# Patient Record
Sex: MALE | Race: WHITE | NOT HISPANIC OR LATINO | Employment: FULL TIME | URBAN - METROPOLITAN AREA
[De-identification: names, ages, dates, MRNs, and addresses within clinical notes are randomized per-mention and may not be internally consistent; named-entity substitution may affect disease eponyms.]

---

## 2018-05-20 ENCOUNTER — OFFICE VISIT (OUTPATIENT)
Dept: RETAIL CLINIC | Facility: CLINIC | Age: 58
End: 2018-05-20

## 2018-05-20 VITALS
DIASTOLIC BLOOD PRESSURE: 70 MMHG | TEMPERATURE: 97.9 F | OXYGEN SATURATION: 98 % | HEART RATE: 79 BPM | SYSTOLIC BLOOD PRESSURE: 118 MMHG

## 2018-05-20 DIAGNOSIS — H10.33 ACUTE BACTERIAL CONJUNCTIVITIS OF BOTH EYES: Primary | ICD-10-CM

## 2018-05-20 PROCEDURE — 99202 OFFICE O/P NEW SF 15 MIN: CPT | Performed by: NURSE PRACTITIONER

## 2018-05-20 RX ORDER — POLYMYXIN B SULFATE AND TRIMETHOPRIM 1; 10000 MG/ML; [USP'U]/ML
SOLUTION OPHTHALMIC
Qty: 1 EACH | Refills: 0 | Status: SHIPPED | OUTPATIENT
Start: 2018-05-20 | End: 2019-03-07

## 2018-05-20 NOTE — PROGRESS NOTES
Subjective     Surjit Stevenson is a 57 y.o.. male.     Conjunctivitis    Episode onset: 1 week. The problem has been unchanged. Associated symptoms include eye itching, eye discharge, eye pain and eye redness. Pertinent negatives include no fever, no decreased vision, no double vision, no photophobia, no abdominal pain, no diarrhea, no nausea, no vomiting, no congestion, no ear pain, no headaches, no rhinorrhea, no sore throat and no cough.       The following portions of the patient's history were reviewed and updated as appropriate: allergies, current medications, past family history, past medical history, past social history, past surgical history and problem list.    Review of Systems   Constitutional: Negative for fever.   HENT: Negative.  Negative for congestion, ear pain, rhinorrhea and sore throat.    Eyes: Positive for pain, discharge, redness and itching. Negative for double vision, photophobia and visual disturbance.   Respiratory: Negative for cough.    Gastrointestinal: Negative for abdominal pain, diarrhea, nausea and vomiting.   Neurological: Negative for headaches.       Objective     Vitals:    05/20/18 1116   BP: 118/70   Pulse: 79   Temp: 97.9 °F (36.6 °C)   TempSrc: Oral   SpO2: 98%       Physical Exam   Constitutional: He is oriented to person, place, and time. He appears well-developed and well-nourished.   HENT:   Head: Normocephalic and atraumatic.   Eyes: Pupils are equal, round, and reactive to light. Right conjunctiva is injected. Left conjunctiva is injected.   Cardiovascular: Normal rate and regular rhythm.    Pulmonary/Chest: Effort normal.   Musculoskeletal: Normal range of motion.   Neurological: He is alert and oriented to person, place, and time.         Assessment/Plan   Surjit was seen today for conjunctivitis.    Diagnoses and all orders for this visit:    Acute bacterial conjunctivitis of both eyes  -     trimethoprim-polymyxin b (POLYTRIM) 59913-8.1 UNIT/ML-% ophthalmic solution; 1  drop to each eye 3-4 times a day for 10 days      Vision screening: right eye 20/25, left eye 20/25, both eyes 20/25    Patient Instructions   Bacterial Conjunctivitis  Bacterial conjunctivitis is an infection of the clear membrane that covers the white part of your eye and the inner surface of your eyelid (conjunctiva). When the blood vessels in your conjunctiva become inflamed, your eye becomes red or pink, and it will probably feel itchy. Bacterial conjunctivitis spreads very easily from person to person (is contagious). It also spreads easily from one eye to the other eye.  What are the causes?  This condition is caused by several common bacteria. You may get the infection if you come into close contact with another person who is infected. You may also come into contact with items that are contaminated with the bacteria, such as a face towel, contact lens solution, or eye makeup.  What increases the risk?  This condition is more likely to develop in people who:  · Are exposed to other people who have the infection.  · Wear contact lenses.  · Have a sinus infection.  · Have had a recent eye injury or surgery.  · Have a weak body defense system (immune system).  · Have a medical condition that causes dry eyes.  What are the signs or symptoms?  Symptoms of this condition include:  · Eye redness.  · Tearing or watery eyes.  · Itchy eyes.  · Burning feeling in your eyes.  · Thick, yellowish discharge from an eye. This may turn into a crust on the eyelid overnight and cause your eyelids to stick together.  · Swollen eyelids.  · Blurred vision.  How is this diagnosed?  Your health care provider can diagnose this condition based on your symptoms and medical history. Your health care provider may also take a sample of discharge from your eye to find the cause of your infection. This is rarely done.  How is this treated?  Treatment for this condition includes:  · Antibiotic eye drops or ointment to clear the infection  more quickly and prevent the spread of infection to others.  · Oral antibiotic medicines to treat infections that do not respond to drops or ointments, or last longer than 10 days.  · Cool, wet cloths (cool compresses) placed on the eyes.  · Artificial tears applied 2-6 times a day.  Follow these instructions at home:  Medicines   · Take or apply your antibiotic medicine as told by your health care provider. Do not stop taking or applying the antibiotic even if you start to feel better.  · Take or apply over-the-counter and prescription medicines only as told by your health care provider.  · Be very careful to avoid touching the edge of your eyelid with the eye drop bottle or the ointment tube when you apply medicines to the affected eye. This will keep you from spreading the infection to your other eye or to other people.  Managing discomfort   · Gently wipe away any drainage from your eye with a warm, wet washcloth or a cotton ball.  · Apply a cool, clean washcloth to your eye for 10-20 minutes, 3-4 times a day.  General instructions   · Do not wear contact lenses until the inflammation is gone and your health care provider says it is safe to wear them again. Ask your health care provider how to sterilize or replace your contact lenses before you use them again. Wear glasses until you can resume wearing contacts.  · Avoid wearing eye makeup until the inflammation is gone. Throw away any old eye cosmetics that may be contaminated.  · Change or wash your pillowcase every day.  · Do not share towels or washcloths. This may spread the infection.  · Wash your hands often with soap and water. Use paper towels to dry your hands.  · Avoid touching or rubbing your eyes.  · Do not drive or use heavy machinery if your vision is blurred.  Contact a health care provider if:  · You have a fever.  · Your symptoms do not get better after 10 days.  Get help right away if:  · You have a fever and your symptoms suddenly get  worse.  · You have severe pain when you move your eye.  · You have facial pain, redness, or swelling.  · You have sudden loss of vision.  This information is not intended to replace advice given to you by your health care provider. Make sure you discuss any questions you have with your health care provider.  Document Released: 12/18/2006 Document Revised: 04/27/2017 Document Reviewed: 09/29/2016  [employer] Patient Education © 2017 [employer]        Return if symptoms worsen or fail to improve with urgent care/ER, for follow up with optomotrist as needed, if no improvement and/or any vision issues.

## 2018-05-20 NOTE — PATIENT INSTRUCTIONS
Bacterial Conjunctivitis  Bacterial conjunctivitis is an infection of the clear membrane that covers the white part of your eye and the inner surface of your eyelid (conjunctiva). When the blood vessels in your conjunctiva become inflamed, your eye becomes red or pink, and it will probably feel itchy. Bacterial conjunctivitis spreads very easily from person to person (is contagious). It also spreads easily from one eye to the other eye.  What are the causes?  This condition is caused by several common bacteria. You may get the infection if you come into close contact with another person who is infected. You may also come into contact with items that are contaminated with the bacteria, such as a face towel, contact lens solution, or eye makeup.  What increases the risk?  This condition is more likely to develop in people who:  · Are exposed to other people who have the infection.  · Wear contact lenses.  · Have a sinus infection.  · Have had a recent eye injury or surgery.  · Have a weak body defense system (immune system).  · Have a medical condition that causes dry eyes.  What are the signs or symptoms?  Symptoms of this condition include:  · Eye redness.  · Tearing or watery eyes.  · Itchy eyes.  · Burning feeling in your eyes.  · Thick, yellowish discharge from an eye. This may turn into a crust on the eyelid overnight and cause your eyelids to stick together.  · Swollen eyelids.  · Blurred vision.  How is this diagnosed?  Your health care provider can diagnose this condition based on your symptoms and medical history. Your health care provider may also take a sample of discharge from your eye to find the cause of your infection. This is rarely done.  How is this treated?  Treatment for this condition includes:  · Antibiotic eye drops or ointment to clear the infection more quickly and prevent the spread of infection to others.  · Oral antibiotic medicines to treat infections that do not respond to drops or  ointments, or last longer than 10 days.  · Cool, wet cloths (cool compresses) placed on the eyes.  · Artificial tears applied 2-6 times a day.  Follow these instructions at home:  Medicines   · Take or apply your antibiotic medicine as told by your health care provider. Do not stop taking or applying the antibiotic even if you start to feel better.  · Take or apply over-the-counter and prescription medicines only as told by your health care provider.  · Be very careful to avoid touching the edge of your eyelid with the eye drop bottle or the ointment tube when you apply medicines to the affected eye. This will keep you from spreading the infection to your other eye or to other people.  Managing discomfort   · Gently wipe away any drainage from your eye with a warm, wet washcloth or a cotton ball.  · Apply a cool, clean washcloth to your eye for 10-20 minutes, 3-4 times a day.  General instructions   · Do not wear contact lenses until the inflammation is gone and your health care provider says it is safe to wear them again. Ask your health care provider how to sterilize or replace your contact lenses before you use them again. Wear glasses until you can resume wearing contacts.  · Avoid wearing eye makeup until the inflammation is gone. Throw away any old eye cosmetics that may be contaminated.  · Change or wash your pillowcase every day.  · Do not share towels or washcloths. This may spread the infection.  · Wash your hands often with soap and water. Use paper towels to dry your hands.  · Avoid touching or rubbing your eyes.  · Do not drive or use heavy machinery if your vision is blurred.  Contact a health care provider if:  · You have a fever.  · Your symptoms do not get better after 10 days.  Get help right away if:  · You have a fever and your symptoms suddenly get worse.  · You have severe pain when you move your eye.  · You have facial pain, redness, or swelling.  · You have sudden loss of vision.  This  information is not intended to replace advice given to you by your health care provider. Make sure you discuss any questions you have with your health care provider.  Document Released: 12/18/2006 Document Revised: 04/27/2017 Document Reviewed: 09/29/2016  [employer] Interactive Patient Education © 2017 [employer]

## 2019-03-07 ENCOUNTER — OFFICE VISIT (OUTPATIENT)
Dept: FAMILY MEDICINE CLINIC | Facility: CLINIC | Age: 59
End: 2019-03-07

## 2019-03-07 VITALS
RESPIRATION RATE: 16 BRPM | DIASTOLIC BLOOD PRESSURE: 64 MMHG | OXYGEN SATURATION: 98 % | SYSTOLIC BLOOD PRESSURE: 120 MMHG | WEIGHT: 182 LBS | TEMPERATURE: 97.7 F | HEART RATE: 78 BPM | BODY MASS INDEX: 26.96 KG/M2 | HEIGHT: 69 IN

## 2019-03-07 DIAGNOSIS — R31.9 HEMATURIA, UNSPECIFIED TYPE: ICD-10-CM

## 2019-03-07 DIAGNOSIS — Z00.00 HEALTHCARE MAINTENANCE: Primary | ICD-10-CM

## 2019-03-07 DIAGNOSIS — Z12.5 PROSTATE CANCER SCREENING: ICD-10-CM

## 2019-03-07 DIAGNOSIS — Z12.11 COLON CANCER SCREENING: ICD-10-CM

## 2019-03-07 LAB
BILIRUB BLD-MCNC: NEGATIVE MG/DL
CLARITY, POC: ABNORMAL
COLOR UR: YELLOW
GLUCOSE UR STRIP-MCNC: NEGATIVE MG/DL
KETONES UR QL: ABNORMAL
LEUKOCYTE EST, POC: NEGATIVE
NITRITE UR-MCNC: NEGATIVE MG/ML
PH UR: 6 [PH] (ref 5–8)
PROT UR STRIP-MCNC: NEGATIVE MG/DL
RBC # UR STRIP: ABNORMAL /UL
SP GR UR: 1.01 (ref 1–1.03)
UROBILINOGEN UR QL: NORMAL

## 2019-03-07 PROCEDURE — 99203 OFFICE O/P NEW LOW 30 MIN: CPT | Performed by: NURSE PRACTITIONER

## 2019-03-07 PROCEDURE — 81002 URINALYSIS NONAUTO W/O SCOPE: CPT | Performed by: NURSE PRACTITIONER

## 2019-03-07 NOTE — PROGRESS NOTES
Patient ID: Surjit Stevenson is a 58 y.o. male     Subjective     Chief Complaint   Patient presents with   • Blood in Urine       History of Present Illness    Surjit Stevenson presents to the office today for new patient evaluation.   He is a .  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  It was noted during testing he had blood in his urine.  He was only given as 6-month license and was instructed to follow-up with primary care provider concerning his hematuria.  He denies any problems or concerns with urination.  No dysuria or increased urinary frequency.  He does admit to increased alcohol intake over the last 2 years. 2 years ago after being  for over 30 years.  Last drink one week ago.  He has not seen a primary care provider in over 15 years.  Denies any prior history of heart or lung disease.  He takes no medications on a regular basis.  He is a former smoker of less than 15 years.  Quit in 1988.  His usual alcohol intake is approximately 5-6 drinks daily which could include any type of alcohol whether it is beer, bourbon, wine, etc.  Father has history of prostate and kidney cancer.        He denies any complaints of fever, chills, cough, chest pain, shortness of air, abdominal pain, nausea, or any other concerns.     The following portions of the patient's history were reviewed and updated as appropriate: allergies, current medications, past family history, past medical history, past social history, past surgical history and problem list.       Review of Systems   Constitution: Negative.   HENT: Negative.    Eyes: Negative.    Cardiovascular: Negative.    Respiratory: Negative.    Endocrine: Negative.    Hematologic/Lymphatic: Negative.    Skin: Negative.    Musculoskeletal: Negative.    Gastrointestinal: Negative.    Genitourinary: Positive for hematuria. Negative for dysuria, flank pain and frequency.   Neurological: Negative.    Psychiatric/Behavioral: Negative.        Vitals:    03/07/19  1535   BP: 120/64   Pulse: 78   Resp: 16   Temp: 97.7 °F (36.5 °C)   SpO2: 98%       Documented weights    03/07/19 1535   Weight: 82.6 kg (182 lb)       Results for orders placed or performed in visit on 03/07/19   POC Urinalysis Dipstick   Result Value Ref Range    Color Yellow Yellow, Straw, Dark Yellow, Ludy    Clarity, UA Hazy (A) Clear    Glucose, UA Negative Negative, 1000 mg/dL (3+) mg/dL    Bilirubin Negative Negative    Ketones, UA Trace (A) Negative    Specific Gravity  1.015 1.005 - 1.030    Blood, UA Large (A) Negative    pH, Urine 6.0 5.0 - 8.0    Protein, POC Negative Negative mg/dL    Urobilinogen, UA Normal Normal    Leukocytes Negative Negative    Nitrite, UA Negative Negative       Objective     Physical Exam   Constitutional: He is oriented to person, place, and time. Vital signs are normal. He appears well-developed.   HENT:   Head: Normocephalic and atraumatic.   Right Ear: Tympanic membrane normal.   Left Ear: Tympanic membrane normal.   Mouth/Throat: Oropharynx is clear and moist.   Eyes: EOM are normal. Pupils are equal, round, and reactive to light.   Neck: Normal range of motion. Neck supple.   Cardiovascular: Normal rate, regular rhythm, normal heart sounds and intact distal pulses.   No murmur heard.  Pulmonary/Chest: Effort normal and breath sounds normal. He has no wheezes. He has no rhonchi. He has no rales.   Abdominal: Soft. Bowel sounds are normal. There is no hepatosplenomegaly. There is no tenderness.   Musculoskeletal: Normal range of motion. He exhibits no edema or tenderness.   Neurological: He is alert and oriented to person, place, and time. He has normal strength.   Skin: Skin is warm and dry. No rash noted. No cyanosis or erythema.   Psychiatric: He has a normal mood and affect. His behavior is normal.          Assessment/Plan     Assessment/Plan   Surjit was seen today for blood in urine.    Diagnoses and all orders for this visit:    Healthcare maintenance  -     CBC &  Differential; Future  -     Comprehensive Metabolic Panel; Future  -     Lipid Panel; Future  -     TSH; Future    Hematuria, unspecified type  -     POC Urinalysis Dipstick  -     Urine Culture - Urine, Urine, Clean Catch  -     CBC & Differential; Future  -     Comprehensive Metabolic Panel; Future  -     CT Abdomen Pelvis Stone Protocol; Future    Colon cancer screening  -     Ambulatory Referral For Screening Colonoscopy    Prostate cancer screening  -     PSA SCREENING; Future          Summary:  Surjit Stevenson presented to the office today for new patient evaluation.  Urinalysis confirmed 4+ blood.  No signs of leukocytes or infection.  Will send urine for culture.  Instructed will need to obtain fasting labs.  Also will need to obtain CT scan of abdomen and pelvis to rule out kidney stones for cause of hematuria.  I am concerned due to his father's history of prostate and kidney cancer.  Along with his history of painless hematuria.  With his labs we will also check a PSA level.  He is 58 years old.  Instructed on the importance of screening colonoscopy which we will also place referral for.  Once lab testing is completed, I will call him with results.  Once a CT scan of abdomen and pelvis is completed, we will do be able to determine further recommendations concerning his hematuria.  In the meantime instructed to continue to refrain from drinking alcohol.  Continue to drink plenty of water.    In the meantime, instructed to contact us for any problems or concerns.    Yennifer Rosa, APRN  Family Medicine  [facility]  03/07/19  4:01 PM

## 2019-03-09 LAB
BACTERIA UR CULT: NO GROWTH
BACTERIA UR CULT: NORMAL

## 2019-03-11 ENCOUNTER — RESULTS ENCOUNTER (OUTPATIENT)
Dept: FAMILY MEDICINE CLINIC | Facility: CLINIC | Age: 59
End: 2019-03-11

## 2019-03-11 DIAGNOSIS — Z00.00 HEALTHCARE MAINTENANCE: ICD-10-CM

## 2019-03-11 DIAGNOSIS — R31.9 HEMATURIA, UNSPECIFIED TYPE: ICD-10-CM

## 2019-03-12 ENCOUNTER — RESULTS ENCOUNTER (OUTPATIENT)
Dept: FAMILY MEDICINE CLINIC | Facility: CLINIC | Age: 59
End: 2019-03-12

## 2019-03-12 DIAGNOSIS — Z12.5 PROSTATE CANCER SCREENING: ICD-10-CM

## 2019-03-12 LAB
ALBUMIN SERPL-MCNC: 3.9 G/DL (ref 3.5–5.2)
ALBUMIN/GLOB SERPL: 1.3 G/DL
ALP SERPL-CCNC: 94 U/L (ref 39–117)
ALT SERPL-CCNC: 17 U/L (ref 1–41)
AST SERPL-CCNC: 18 U/L (ref 1–40)
BASOPHILS # BLD AUTO: 0.01 10*3/MM3 (ref 0–0.2)
BASOPHILS NFR BLD AUTO: 0.2 % (ref 0–1.5)
BILIRUB SERPL-MCNC: 0.6 MG/DL (ref 0.1–1.2)
BUN SERPL-MCNC: 16 MG/DL (ref 6–20)
BUN/CREAT SERPL: 17.2 (ref 7–25)
CALCIUM SERPL-MCNC: 9.5 MG/DL (ref 8.6–10.5)
CHLORIDE SERPL-SCNC: 104 MMOL/L (ref 98–107)
CHOLEST SERPL-MCNC: 152 MG/DL (ref 0–200)
CO2 SERPL-SCNC: 25.1 MMOL/L (ref 22–29)
CREAT SERPL-MCNC: 0.93 MG/DL (ref 0.76–1.27)
EOSINOPHIL # BLD AUTO: 0.07 10*3/MM3 (ref 0–0.4)
EOSINOPHIL NFR BLD AUTO: 1.6 % (ref 0.3–6.2)
ERYTHROCYTE [DISTWIDTH] IN BLOOD BY AUTOMATED COUNT: 13.3 % (ref 12.3–15.4)
GLOBULIN SER CALC-MCNC: 3 GM/DL
GLUCOSE SERPL-MCNC: 88 MG/DL (ref 65–99)
HCT VFR BLD AUTO: 44.5 % (ref 37.5–51)
HDLC SERPL-MCNC: 46 MG/DL (ref 40–60)
HGB BLD-MCNC: 13.7 G/DL (ref 13–17.7)
IMM GRANULOCYTES # BLD AUTO: 0.01 10*3/MM3 (ref 0–0.05)
IMM GRANULOCYTES NFR BLD AUTO: 0.2 % (ref 0–0.5)
LDLC SERPL CALC-MCNC: 96 MG/DL (ref 0–100)
LYMPHOCYTES # BLD AUTO: 0.72 10*3/MM3 (ref 0.7–3.1)
LYMPHOCYTES NFR BLD AUTO: 16.1 % (ref 19.6–45.3)
MCH RBC QN AUTO: 29.2 PG (ref 26.6–33)
MCHC RBC AUTO-ENTMCNC: 30.8 G/DL (ref 31.5–35.7)
MCV RBC AUTO: 94.9 FL (ref 79–97)
MONOCYTES # BLD AUTO: 0.35 10*3/MM3 (ref 0.1–0.9)
MONOCYTES NFR BLD AUTO: 7.8 % (ref 5–12)
NEUTROPHILS # BLD AUTO: 3.32 10*3/MM3 (ref 1.4–7)
NEUTROPHILS NFR BLD AUTO: 74.1 % (ref 42.7–76)
NRBC BLD AUTO-RTO: 0 /100 WBC (ref 0–0)
PLATELET # BLD AUTO: 185 10*3/MM3 (ref 140–450)
POTASSIUM SERPL-SCNC: 4.3 MMOL/L (ref 3.5–5.2)
PROT SERPL-MCNC: 6.9 G/DL (ref 6–8.5)
PSA SERPL-MCNC: 2.39 NG/ML (ref 0–4)
RBC # BLD AUTO: 4.69 10*6/MM3 (ref 4.14–5.8)
SODIUM SERPL-SCNC: 140 MMOL/L (ref 136–145)
TRIGL SERPL-MCNC: 50 MG/DL (ref 0–150)
TSH SERPL DL<=0.005 MIU/L-ACNC: 1.23 MIU/ML (ref 0.27–4.2)
VLDLC SERPL CALC-MCNC: 10 MG/DL (ref 5–40)
WBC # BLD AUTO: 4.48 10*3/MM3 (ref 3.4–10.8)
WRITTEN AUTHORIZATION: NORMAL

## 2019-03-12 NOTE — PROGRESS NOTES
Notify Surjit Stevenson all his recent labs are stable including his prostate screening test.  Urine test did not show any signs of infection on culture that was sent out.  We will proceed with CT scan of abdomen and pelvis for further evaluation concerning his hematuria.

## 2019-03-15 ENCOUNTER — HOSPITAL ENCOUNTER (OUTPATIENT)
Dept: CT IMAGING | Facility: HOSPITAL | Age: 59
Discharge: HOME | End: 2019-03-15
Admitting: NURSE PRACTITIONER

## 2019-03-15 DIAGNOSIS — R31.9 HEMATURIA, UNSPECIFIED TYPE: ICD-10-CM

## 2019-03-15 PROCEDURE — 74176 CT ABD & PELVIS W/O CONTRAST: CPT

## 2019-03-18 DIAGNOSIS — R31.9 HEMATURIA, UNSPECIFIED TYPE: Primary | ICD-10-CM

## 2023-04-19 ENCOUNTER — OFFICE VISIT (OUTPATIENT)
Dept: INTERNAL MEDICINE | Facility: CLINIC | Age: 63
End: 2023-04-19
Payer: COMMERCIAL

## 2023-04-19 ENCOUNTER — TELEPHONE (OUTPATIENT)
Dept: INTERNAL MEDICINE | Facility: CLINIC | Age: 63
End: 2023-04-19

## 2023-04-19 VITALS
WEIGHT: 186 LBS | BODY MASS INDEX: 27.55 KG/M2 | DIASTOLIC BLOOD PRESSURE: 72 MMHG | TEMPERATURE: 96.9 F | SYSTOLIC BLOOD PRESSURE: 118 MMHG | HEIGHT: 69 IN | HEART RATE: 103 BPM | OXYGEN SATURATION: 95 %

## 2023-04-19 DIAGNOSIS — Z78.9 ALCOHOL USE: ICD-10-CM

## 2023-04-19 DIAGNOSIS — M25.462 PAIN AND SWELLING OF LEFT KNEE: Primary | ICD-10-CM

## 2023-04-19 DIAGNOSIS — M25.562 PAIN AND SWELLING OF LEFT KNEE: Primary | ICD-10-CM

## 2023-04-19 DIAGNOSIS — Z12.11 SCREENING FOR COLON CANCER: ICD-10-CM

## 2023-04-19 DIAGNOSIS — E66.3 OVERWEIGHT (BMI 25.0-29.9): ICD-10-CM

## 2023-04-19 NOTE — TELEPHONE ENCOUNTER
Caller: Surjit Stevenson    Relationship to patient: Self    Best call back number: [phone]    Patient is needing: PATIENT STATES HE SEEN DR SORENSEN TODAY AND SHE WAS SENDING IN A PRESCRIPTION FOR AN  anti-inflammatory  AND [facility] INFORMED HIM THAT IT HAS NOT BEEN RECEIVED

## 2023-04-19 NOTE — PROGRESS NOTES
"Chief Complaint  Establish Care and Joint Swelling    Subjective        Surjit Stevenson presents to [facility] PRIMARY [facility]  History of Present Illness     Mr. Stevenson is a crow 61 yo M who presents to establish care and has pain in bones and joint for the last 1 year.      Had knee operations b/l about 40 years ago.  Hernia surgery about 25 years ago.     Has been  for the last 6 years and has been drinking a bit more.  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 Quit smoking 34 years ago.         Objective   Vital Signs:  /72   Pulse 103   Temp 96.9 °F (36.1 °C)   Ht 175.3 cm (69.02\")   Wt 84.4 kg (186 lb)   SpO2 95%   BMI 27.45 kg/m²   Estimated body mass index is 27.45 kg/m² as calculated from the following:    Height as of this encounter: 175.3 cm (69.02\").    Weight as of this encounter: 84.4 kg (186 lb).       BMI is >= 25 and <30. (Overweight) The following options were offered after discussion;: exercise counseling/recommendations and nutrition counseling/recommendations      Physical Exam  Vitals reviewed.   Constitutional:       General: He is not in acute distress.     Appearance: Normal appearance.   HENT:      Head: Normocephalic and atraumatic.      Nose: Nose normal.      Mouth/Throat:      Mouth: Mucous membranes are moist.   Eyes:      Conjunctiva/sclera: Conjunctivae normal.   Cardiovascular:      Rate and Rhythm: Normal rate and regular rhythm.      Pulses: Normal pulses.      Heart sounds: Normal heart sounds.   Pulmonary:      Effort: Pulmonary effort is normal.      Breath sounds: Normal breath sounds.   Abdominal:      Palpations: Abdomen is soft.      Tenderness: There is no abdominal tenderness.   Musculoskeletal:      Right lower leg: No edema.      Left lower leg: No edema.      Comments: Very swollen left knee with very mild warmth, but no real erythema, wound.  Enlarged elbow joints as well   Skin:     General: Skin is warm and dry. "   Neurological:      General: No focal deficit present.      Mental Status: He is alert.   Psychiatric:         Mood and Affect: Mood normal.        Result Review :  The following data was reviewed by: Felicia Tomlinson MD on 04/19/2023:  Common labs        4/19/2023    09:36   Common Labs   Glucose 102     BUN 14     Creatinine 0.96     Sodium 137     Potassium 4.5     Chloride 101     Calcium 9.4     Total Protein 7.4     Albumin 3.8     Total Bilirubin 0.4     Alkaline Phosphatase 125     AST (SGOT) 24     ALT (SGPT) 32     Total Cholesterol 194     Triglycerides 73     HDL Cholesterol 57     LDL Cholesterol  124     Hemoglobin A1C 5.90     Uric Acid 7.2       Data reviewed: notes and labs from [place] chart             Assessment and Plan   Diagnoses and all orders for this visit:    1. Pain and swelling of left knee (Primary)  -     Uric Acid  -     XR knee 3 vw left  -     Ambulatory Referral to Orthopedic Surgery    2. Overweight (BMI 25.0-29.9)  -     Lipid Panel  -     Hemoglobin A1c  -     Comprehensive Metabolic Panel    3. Alcohol use  -     Lipid Panel  -     Hemoglobin A1c  -     Comprehensive Metabolic Panel    4. Screening for colon cancer  -     Cologuard - Stool, Per Rectum; Future      Communicated with ortho via scheduling who got him in tomorrow AM for drainage of knee effusion and assessment of associated fluid.  Start on Mobic for pain relief.  Basic labs for assessment of multiple chronic health issues.    Briefly discuss appropriate alcohol use and need to drink in moderation.     Discussed risk/benefit of types of colon cancer screening.  Brynn agreed upon         Follow Up   No follow-ups on file.  Patient was given instructions and counseling regarding his condition or for health maintenance advice. Please see specific information pulled into the AVS if appropriate.

## 2023-04-20 ENCOUNTER — OFFICE VISIT (OUTPATIENT)
Dept: ORTHOPEDIC SURGERY | Facility: CLINIC | Age: 63
End: 2023-04-20
Payer: COMMERCIAL

## 2023-04-20 VITALS
SYSTOLIC BLOOD PRESSURE: 133 MMHG | HEART RATE: 84 BPM | WEIGHT: 186 LBS | DIASTOLIC BLOOD PRESSURE: 83 MMHG | BODY MASS INDEX: 27.55 KG/M2 | HEIGHT: 69 IN

## 2023-04-20 DIAGNOSIS — M25.462 EFFUSION OF LEFT KNEE: ICD-10-CM

## 2023-04-20 DIAGNOSIS — M17.32 POST-TRAUMATIC OSTEOARTHRITIS OF LEFT KNEE: Primary | ICD-10-CM

## 2023-04-20 LAB
ALBUMIN SERPL-MCNC: 3.8 G/DL (ref 3.5–5.2)
ALBUMIN/GLOB SERPL: 1.1 G/DL
ALP SERPL-CCNC: 125 U/L (ref 39–117)
ALT SERPL-CCNC: 32 U/L (ref 1–41)
APPEARANCE FLD: ABNORMAL
AST SERPL-CCNC: 24 U/L (ref 1–40)
BILIRUB SERPL-MCNC: 0.4 MG/DL (ref 0–1.2)
BUN SERPL-MCNC: 14 MG/DL (ref 8–23)
BUN/CREAT SERPL: 14.6 (ref 7–25)
CALCIUM SERPL-MCNC: 9.4 MG/DL (ref 8.6–10.5)
CHLORIDE SERPL-SCNC: 101 MMOL/L (ref 98–107)
CHOLEST SERPL-MCNC: 194 MG/DL (ref 0–200)
CO2 SERPL-SCNC: 26.3 MMOL/L (ref 22–29)
COLOR FLD: ABNORMAL
CREAT SERPL-MCNC: 0.96 MG/DL (ref 0.76–1.27)
CRYSTALS FLD MICRO: NORMAL
EGFRCR SERPLBLD CKD-EPI 2021: 90.5 ML/MIN/1.73
GLOBULIN SER CALC-MCNC: 3.6 GM/DL
GLUCOSE SERPL-MCNC: 102 MG/DL (ref 65–99)
HBA1C MFR BLD: 5.9 % (ref 4.8–5.6)
HDLC SERPL-MCNC: 57 MG/DL (ref 40–60)
LDLC SERPL CALC-MCNC: 124 MG/DL (ref 0–100)
LYMPHOCYTES NFR FLD MANUAL: 25 %
NEUTROPHILS NFR FLD MANUAL: 75 %
POTASSIUM SERPL-SCNC: 4.5 MMOL/L (ref 3.5–5.2)
PROT SERPL-MCNC: 7.4 G/DL (ref 6–8.5)
RBC # FLD AUTO: 7000 /MM3
SODIUM SERPL-SCNC: 137 MMOL/L (ref 136–145)
TRIGL SERPL-MCNC: 73 MG/DL (ref 0–150)
URATE SERPL-MCNC: 7.2 MG/DL (ref 3.4–7)
VLDLC SERPL CALC-MCNC: 13 MG/DL (ref 5–40)
WBC # FLD AUTO: ABNORMAL /MM3

## 2023-04-20 PROCEDURE — 87015 SPECIMEN INFECT AGNT CONCNTJ: CPT | Performed by: INTERNAL MEDICINE

## 2023-04-20 PROCEDURE — 87205 SMEAR GRAM STAIN: CPT | Performed by: INTERNAL MEDICINE

## 2023-04-20 PROCEDURE — 89060 EXAM SYNOVIAL FLUID CRYSTALS: CPT | Performed by: INTERNAL MEDICINE

## 2023-04-20 PROCEDURE — 89051 BODY FLUID CELL COUNT: CPT | Performed by: INTERNAL MEDICINE

## 2023-04-20 PROCEDURE — 87075 CULTR BACTERIA EXCEPT BLOOD: CPT | Performed by: INTERNAL MEDICINE

## 2023-04-20 PROCEDURE — 87070 CULTURE OTHR SPECIMN AEROBIC: CPT | Performed by: INTERNAL MEDICINE

## 2023-04-20 RX ORDER — MELOXICAM 15 MG/1
15 TABLET ORAL DAILY
Qty: 30 TABLET | Refills: 0 | Status: SHIPPED | OUTPATIENT
Start: 2023-04-20

## 2023-04-20 RX ORDER — ACETAMINOPHEN 500 MG
500 TABLET ORAL EVERY 6 HOURS PRN
COMMUNITY

## 2023-04-20 NOTE — PROGRESS NOTES
"Subjective:     Patient ID: Surjit Stevenson is a 62 y.o. male.    Chief Complaint:    History of Present Illness  Surjit Stevenson presents to clinic today for evaluation of severe left knee pain and swelling.  The patient has had multiple knee surgeries in the past on his left knee and he has quite a few scars.  He states usually his knee has swollen and in the past but when it does it usually goes down over the next few days.  He states that it is pink and significantly swollen for the last 6 months.  He saw Dr. Tomlinson yesterday and had elevated uric acid so he was sent to me for aspiration and further evaluation and management.  The patient is not sure if he has a history of gout but he does have a history of multiple painful joints and swelling.  He had an elevated uric acid yesterday.     Social History     Occupational History   • Not on file   Tobacco Use   • Smoking status: Former     Types: Cigarettes     Quit date:      Years since quittin.3     Passive exposure: Past   • Smokeless tobacco: Never   • Tobacco comments:     quit 30 yrs ago; was 1 ppd for 11 yrs   Vaping Use   • Vaping Use: Never used   Substance and Sexual Activity   • Alcohol use: Yes     Comment: 10   • Drug use: No   • Sexual activity: Defer      History reviewed. No pertinent past medical history.  Past Surgical History:   Procedure Laterality Date   • HERNIA REPAIR     • KNEE SURGERY Bilateral     1980s       Family History   Problem Relation Age of Onset   • Hypertension Mother    • Prostate cancer Father    • Kidney cancer Father                  Objective:  Vitals:    23 0755   BP: 133/83   Pulse: 84   Weight: 84.4 kg (186 lb)   Height: 175.3 cm (69.02\")         23  0755   Weight: 84.4 kg (186 lb)     Body mass index is 27.45 kg/m².        Left Knee Exam     Tenderness   The patient is experiencing tenderness in the medial retinaculum, medial joint line, medial hamstring, lateral retinaculum, patella and lateral joint " line.    Range of Motion   Extension: 0   Flexion: 120     Tests   Sonia:  Medial - negative Lateral - negative  Varus: positive Valgus: negative  Drawer:  Anterior - positive     Posterior - negative    Other   Erythema: absent  Scars: present  Sensation: normal  Pulse: present  Swelling: moderate  Effusion: effusion present    Comments:  Large effusion without erythema or warmth.  Palpation seems indicate there is a rent in the medial retinaculum.               Imagin standing views of the left knee were ordered and reviewed myself in the office today  Indication: Left knee pain  Findings: X-rays demonstrate severe posttraumatic osteoarthritis of the left knee.  There is signs of prior MCL reconstruction.  There is complete loss of joint space in the medial side with large marginal osteophytes.  Large suprapatellar effusion noted.  No acute fracture dislocation or subluxation  Comparative studies: None    Assessment:        1. Post-traumatic osteoarthritis of left knee    2. Effusion of left knee           Plan:          1. Discussed treatment options at length with patient at today's visit.  I discussed with the patient that I believe that this is run-of-the-mill posttraumatic osteoarthritis but there is a possibility that he may have gout.  I am not currently concerned about infection but since we are sending aspirate to the lab we will go ahead and run it for culture and Gram stain.  I recommended aspiration and corticosteroid injection into his left knee.  The left knee was aspirated and returned roughly 100 cc of yellow but cloudy joint fluid.  We will send the aspirate for Gram stain cell count crystals and culture.  I will call the patient with results.  2. Follow up: As needed      Surjit Stevenson was in agreement with plan and had all questions answered.     Medications:  No orders of the defined types were placed in this encounter.      Followup:  No follow-ups on file.    Diagnoses and all orders for  this visit:    1. Post-traumatic osteoarthritis of left knee (Primary)  -     XR Knee 3+ View With Devers Left  -     Crystal Exam, Fluid - Synovial Fluid, Knee, Left; Future  -     Body Fluid Cell Count With Differential - Body Fluid, Knee, Left; Future  -     Gram Stain - No Culture - , Knee, Left; Future  -     Anaerobic Culture - Swab, Knee, Left; Future  -     Culture, Routine - Swab, Knee, Left; Future    2. Effusion of left knee          Dictated utilizing Dragon dictation

## 2023-04-23 LAB — BACTERIA SPEC ANAEROBE CULT: NORMAL

## 2023-04-25 LAB
BACTERIA FLD CULT: NORMAL
BACTERIA SPEC ANAEROBE CULT: NORMAL
GRAM STN SPEC: NORMAL
GRAM STN SPEC: NORMAL

## 2023-05-16 ENCOUNTER — OFFICE VISIT (OUTPATIENT)
Dept: INTERNAL MEDICINE | Facility: CLINIC | Age: 63
End: 2023-05-16
Payer: COMMERCIAL

## 2023-05-16 VITALS
SYSTOLIC BLOOD PRESSURE: 118 MMHG | HEART RATE: 83 BPM | HEIGHT: 69 IN | OXYGEN SATURATION: 96 % | WEIGHT: 193.2 LBS | BODY MASS INDEX: 28.61 KG/M2 | TEMPERATURE: 98.4 F | DIASTOLIC BLOOD PRESSURE: 60 MMHG

## 2023-05-16 DIAGNOSIS — M17.0 PRIMARY OSTEOARTHRITIS OF BOTH KNEES: Primary | ICD-10-CM

## 2023-05-16 DIAGNOSIS — F32.1 CURRENT MODERATE EPISODE OF MAJOR DEPRESSIVE DISORDER WITHOUT PRIOR EPISODE: ICD-10-CM

## 2023-05-16 PROCEDURE — 99213 OFFICE O/P EST LOW 20 MIN: CPT | Performed by: INTERNAL MEDICINE

## 2023-05-16 RX ORDER — CITALOPRAM 10 MG/1
10 TABLET ORAL DAILY
Qty: 90 TABLET | Refills: 0 | Status: SHIPPED | OUTPATIENT
Start: 2023-05-16

## 2023-05-16 RX ORDER — NAPROXEN 500 MG/1
500 TABLET ORAL 2 TIMES DAILY WITH MEALS
Qty: 60 TABLET | Refills: 0 | Status: SHIPPED | OUTPATIENT
Start: 2023-05-16

## 2023-05-16 NOTE — PROGRESS NOTES
"      Surjit Stevenson is a 62 y.o. male who presents with a chief complaint of   Chief Complaint   Patient presents with   • Joint Swelling     F/U on labs and knee pain       HPI      Left knee better following drainage with Dr. Dowling, but feels it may have started swelling up again.     Talked about drinking too much.  Discussed cutting back to help with cholesterol, A1c.  Not a lot of soda.  We discussed drinking and its relationship to his family and wife leaving.       The following portions of the patient's history were reviewed and updated as appropriate: allergies, current medications, past family history, past medical history, past social history, past surgical history and problem list.      Current Outpatient Medications:   •  acetaminophen (TYLENOL) 500 MG tablet, Take 1 tablet by mouth Every 6 (Six) Hours As Needed for Mild Pain., Disp: , Rfl:   •  citalopram (CeleXA) 10 MG tablet, Take 1 tablet by mouth Daily., Disp: 90 tablet, Rfl: 0  •  naproxen (Naprosyn) 500 MG tablet, Take 1 tablet by mouth 2 (Two) Times a Day With Meals., Disp: 60 tablet, Rfl: 0            Physical Exam  /60 (BP Location: Left arm, Patient Position: Sitting, Cuff Size: Large Adult)   Pulse 83   Temp 98.4 °F (36.9 °C) (Infrared)   Ht 175.3 cm (69.02\")   Wt 87.6 kg (193 lb 3.2 oz)   SpO2 96%   BMI 28.51 kg/m²     Physical Exam  Vitals reviewed.   Constitutional:       General: He is not in acute distress.     Appearance: Normal appearance.   HENT:      Head: Normocephalic and atraumatic.      Nose: Nose normal.      Mouth/Throat:      Mouth: Mucous membranes are moist.   Eyes:      Conjunctiva/sclera: Conjunctivae normal.   Cardiovascular:      Rate and Rhythm: Normal rate and regular rhythm.      Pulses: Normal pulses.      Heart sounds: Normal heart sounds.   Pulmonary:      Effort: Pulmonary effort is normal.      Breath sounds: Normal breath sounds.   Abdominal:      Palpations: Abdomen is soft.      Tenderness: There " is no abdominal tenderness.   Musculoskeletal:      Right lower leg: No edema.      Left lower leg: No edema.   Skin:     General: Skin is warm and dry.   Neurological:      General: No focal deficit present.      Mental Status: He is alert.   Psychiatric:         Mood and Affect: Mood normal.           Results for orders placed or performed in visit on 04/20/23   Anaerobic Culture - Swab, Knee, Left    Specimen: Knee, Left; Swab   Result Value Ref Range    Anaerobic Culture No anaerobes isolated at 5 days    Body Fluid Culture - Body Fluid, Knee, Left    Specimen: Knee, Left; Body Fluid   Result Value Ref Range    Body Fluid Culture No growth at 5 days     Gram Stain No WBCs seen     Gram Stain No organisms seen    Crystal Exam, Fluid - Synovial Fluid, Knee, Left    Specimen: Knee, Left; Synovial Fluid   Result Value Ref Range    Crystals, Fluid None Seen    Body fluid cell count - Body Fluid, Knee, Left    Specimen: Knee, Left; Body Fluid   Result Value Ref Range    Color, Fluid Dark Yellow     Appearance, Fluid Lt Turbid (A) Clear    WBC, Fluid 26,080 /mm3    RBC, Fluid 7,000 /mm3   Body fluid differential - Body Fluid, Knee, Left    Specimen: Knee, Left; Body Fluid   Result Value Ref Range    Neutrophils, Fluid 75 %    Lymphocytes, Fluid 25 %           Diagnoses and all orders for this visit:    1. Primary osteoarthritis of both knees (Primary)  -     naproxen (Naprosyn) 500 MG tablet; Take 1 tablet by mouth 2 (Two) Times a Day With Meals.  Dispense: 60 tablet; Refill: 0    2. Current moderate episode of major depressive disorder without prior episode  -     citalopram (CeleXA) 10 MG tablet; Take 1 tablet by mouth Daily.  Dispense: 90 tablet; Refill: 0

## 2023-05-18 ENCOUNTER — TELEPHONE (OUTPATIENT)
Dept: INTERNAL MEDICINE | Facility: CLINIC | Age: 63
End: 2023-05-18
Payer: COMMERCIAL

## 2023-05-18 NOTE — TELEPHONE ENCOUNTER
HUB TO SHARE  ----- Message from Felicia Tomlinson MD sent at 5/18/2023 10:08 AM EDT -----  Can you let Mr. Stevenson know his Cologuard was negative.  Thanks!     
94.7

## 2023-07-17 PROBLEM — F33.1 MODERATE EPISODE OF RECURRENT MAJOR DEPRESSIVE DISORDER: Status: ACTIVE | Noted: 2023-07-17

## 2023-07-17 PROBLEM — M17.12 PRIMARY OSTEOARTHRITIS OF LEFT KNEE: Status: ACTIVE | Noted: 2023-07-17

## 2023-08-07 DIAGNOSIS — M17.0 PRIMARY OSTEOARTHRITIS OF BOTH KNEES: ICD-10-CM

## 2023-08-07 NOTE — TELEPHONE ENCOUNTER
HGB greater than 10 or HCT greater than 30 in past 9 months   Rx Refill Note  Requested Prescriptions     Pending Prescriptions Disp Refills    naproxen (Naprosyn) 500 MG tablet 60 tablet 0     Sig: Take 1 tablet by mouth 2 (Two) Times a Day With Meals.      Last office visit with prescribing clinician: 7/17/2023   Last telemedicine visit with prescribing clinician: Visit date not found   Next office visit with prescribing clinician: 11/17/2023                         Would you like a call back once the refill request has been completed: [] Yes [] No    If the office needs to give you a call back, can they leave a voicemail: [] Yes [] No    Maribel Yusuf, PCT  08/07/23, 11:14 EDT

## 2023-08-07 NOTE — TELEPHONE ENCOUNTER
Caller: Surjit Stevenson    Relationship: Self    Best call back number: [phone]     Requested Prescriptions:   Requested Prescriptions     Pending Prescriptions Disp Refills    naproxen (Naprosyn) 500 MG tablet 60 tablet 0     Sig: Take 1 tablet by mouth 2 (Two) Times a Day With Meals.        Pharmacy where request should be sent: [facility] [address] [place], [place] - [address] [facility] [phone] [facility] [phone] FX     Last office visit with prescribing clinician: 7/17/2023   Last telemedicine visit with prescribing clinician: Visit date not found   Next office visit with prescribing clinician: 11/17/2023     Additional details provided by patient: PATIENT IS OUT OF MEDICATION    Does the patient have less than a 3 day supply:  [x] Yes  [] No    Would you like a call back once the refill request has been completed: [] Yes [x] No    If the office needs to give you a call back, can they leave a voicemail: [] Yes [x] No    Rae Dalton Rep   08/07/23 09:47 EDT

## 2023-08-08 RX ORDER — NAPROXEN 500 MG/1
500 TABLET ORAL 2 TIMES DAILY WITH MEALS
Qty: 60 TABLET | Refills: 2 | Status: SHIPPED | OUTPATIENT
Start: 2023-08-08

## 2023-08-31 ENCOUNTER — OFFICE VISIT (OUTPATIENT)
Dept: ORTHOPEDIC SURGERY | Facility: CLINIC | Age: 63
End: 2023-08-31
Payer: COMMERCIAL

## 2023-08-31 VITALS — BODY MASS INDEX: 28.58 KG/M2 | HEIGHT: 69 IN | WEIGHT: 193 LBS

## 2023-08-31 DIAGNOSIS — M17.32 POST-TRAUMATIC OSTEOARTHRITIS OF LEFT KNEE: Primary | ICD-10-CM

## 2023-08-31 RX ORDER — TRIAMCINOLONE ACETONIDE 40 MG/ML
80 INJECTION, SUSPENSION INTRA-ARTICULAR; INTRAMUSCULAR
Status: COMPLETED | OUTPATIENT
Start: 2023-08-31 | End: 2023-08-31

## 2023-08-31 RX ORDER — LIDOCAINE HYDROCHLORIDE 10 MG/ML
4 INJECTION, SOLUTION EPIDURAL; INFILTRATION; INTRACAUDAL; PERINEURAL
Status: COMPLETED | OUTPATIENT
Start: 2023-08-31 | End: 2023-08-31

## 2023-08-31 RX ADMIN — LIDOCAINE HYDROCHLORIDE 4 ML: 10 INJECTION, SOLUTION EPIDURAL; INFILTRATION; INTRACAUDAL; PERINEURAL at 15:22

## 2023-08-31 RX ADMIN — TRIAMCINOLONE ACETONIDE 80 MG: 40 INJECTION, SUSPENSION INTRA-ARTICULAR; INTRAMUSCULAR at 15:22

## 2023-08-31 NOTE — PROGRESS NOTES
Plan:      Large Joint Arthrocentesis: L knee  Date/Time: 8/31/2023 3:22 PM  Consent given by: patient  Site marked: site marked  Timeout: Immediately prior to procedure a time out was called to verify the correct patient, procedure, equipment, support staff and site/side marked as required   Supporting Documentation  Indications: pain   Procedure Details  Location: knee - L knee  Preparation: Patient was prepped and draped in the usual sterile fashion  Needle size: 18 G  Approach: superior  Medications administered: 4 mL lidocaine PF 1% 1 %; 80 mg triamcinolone acetonide 40 MG/ML  Aspirate amount: 100 mL  Aspirate: blood-tinged  Patient tolerance: patient tolerated the procedure well with no immediate complications      No diagnosis found.    Patient presents to clinic today for left knee aspiration and injection(s). I explained details of injections as well as risks, benefits and alternatives with the patient today, had all questions answered, wished to proceed with injection.  I will see patient back as needed for follow up on injections. Patient was instructed to watch for signs or symptoms of infection including redness, swelling, warmth to the touch, or significant increased pain and to contact our office immediately if any of these issues were noted.      Follow up: As needed but not until November 30 first 2023 if she would like another injection.

## 2023-09-22 DIAGNOSIS — M17.0 PRIMARY OSTEOARTHRITIS OF BOTH KNEES: ICD-10-CM

## 2023-09-22 RX ORDER — NAPROXEN 500 MG/1
500 TABLET ORAL 2 TIMES DAILY WITH MEALS
Qty: 60 TABLET | Refills: 2 | Status: SHIPPED | OUTPATIENT
Start: 2023-09-22

## 2023-09-22 NOTE — TELEPHONE ENCOUNTER
Caller: Surjit Stevenson    Relationship: Self    Best call back number: [phone]    Requested Prescriptions:   Requested Prescriptions     Pending Prescriptions Disp Refills    naproxen (Naprosyn) 500 MG tablet 60 tablet 2     Sig: Take 1 tablet by mouth 2 (Two) Times a Day With Meals.        Pharmacy where request should be sent: [facility] [address] [facility] [place] - [address] [facility] [phone] [facility] [phone] FX     Last office visit with prescribing clinician: 7/17/2023   Last telemedicine visit with prescribing clinician: Visit date not found   Next office visit with prescribing clinician: 11/17/2023     Additional details provided by patient:     Does the patient have less than a 3 day supply:  [] Yes  [] No    Would you like a call back once the refill request has been completed: [] Yes [] No    If the office needs to give you a call back, can they leave a voicemail: [] Yes [] No    Rae Roberts   09/22/23 09:11 EDT          97.9

## 2023-11-16 ENCOUNTER — OFFICE VISIT (OUTPATIENT)
Dept: ORTHOPEDIC SURGERY | Facility: CLINIC | Age: 63
End: 2023-11-16
Payer: COMMERCIAL

## 2023-11-16 ENCOUNTER — LAB (OUTPATIENT)
Dept: LAB | Facility: HOSPITAL | Age: 63
End: 2023-11-16
Payer: COMMERCIAL

## 2023-11-16 VITALS — HEIGHT: 69 IN | WEIGHT: 193 LBS | BODY MASS INDEX: 28.58 KG/M2

## 2023-11-16 DIAGNOSIS — M17.32 POST-TRAUMATIC OSTEOARTHRITIS OF LEFT KNEE: Primary | ICD-10-CM

## 2023-11-16 DIAGNOSIS — M25.462 EFFUSION OF LEFT KNEE: ICD-10-CM

## 2023-11-16 DIAGNOSIS — M17.32 POST-TRAUMATIC OSTEOARTHRITIS OF LEFT KNEE: ICD-10-CM

## 2023-11-16 LAB
APPEARANCE FLD: ABNORMAL
BASOPHILS NFR FLD: 0 %
BLASTS NFR FLD: 0 %
COLOR FLD: YELLOW
CRYSTALS FLD MICRO: NORMAL
EOSINOPHIL NFR FLD MANUAL: 0 %
LYMPHOCYTES NFR FLD MANUAL: 13 %
MACROPHAGE FLUID: 0 %
MESOTHL CELL NFR FLD MANUAL: 0 %
MONOCYTES NFR FLD: 0 %
MONOS+MACROS NFR FLD: 0 %
NEUTROPHILS NFR FLD MANUAL: 87 %
NRBC FLD-RTO: 0 /100 WBCS
OTHER CELLS FLUID PER 100/WBCS: 0 /100 WBCS
PLASMA CELLS NFR FLD: 0 %
RBC # FLD AUTO: 6000 /MM3
UNCLASSIFIED CELLS, FLUID: 0 %
WBC # FLD AUTO: ABNORMAL /MM3

## 2023-11-16 PROCEDURE — 89051 BODY FLUID CELL COUNT: CPT

## 2023-11-16 PROCEDURE — 89060 EXAM SYNOVIAL FLUID CRYSTALS: CPT

## 2023-11-16 PROCEDURE — 87205 SMEAR GRAM STAIN: CPT

## 2023-11-16 PROCEDURE — 87070 CULTURE OTHR SPECIMN AEROBIC: CPT

## 2023-11-16 PROCEDURE — 87075 CULTR BACTERIA EXCEPT BLOOD: CPT

## 2023-11-16 NOTE — PROGRESS NOTES
"Subjective:     Patient ID: Surjit Stevenson is a 63 y.o. male.    Chief Complaint:    History of Present Illness  Surjit Stevenson returns to clinic today for evaluation of left knee pain and swelling.  The patient saw me on 2023 and at that time was diagnosed with severe left knee posttraumatic osteoarthritis.  He had a large knee effusion which was aspirated and he had an intra-articular injection.  He states that he did quite well until about a few weeks ago when he started to notice increasing swelling and pain again.  The patient states he has been drinking about 8-12 beers per day.  He says that he is hardly able to work now because of his knee pain and instability.     Social History     Occupational History    Not on file   Tobacco Use    Smoking status: Former     Types: Cigarettes     Quit date:      Years since quittin.8     Passive exposure: Past    Smokeless tobacco: Never    Tobacco comments:     quit 30 yrs ago; was 1 ppd for 11 yrs   Vaping Use    Vaping Use: Never used   Substance and Sexual Activity    Alcohol use: Yes     Comment: 10    Drug use: No    Sexual activity: Defer      History reviewed. No pertinent past medical history.  Past Surgical History:   Procedure Laterality Date    HERNIA REPAIR      KNEE SURGERY Bilateral     1980s       Family History   Problem Relation Age of Onset    Hypertension Mother     Prostate cancer Father     Kidney cancer Father                  Objective:  Vitals:    23 1059   Weight: 87.5 kg (193 lb)   Height: 175.3 cm (69.02\")         23  1059   Weight: 87.5 kg (193 lb)     Body mass index is 28.48 kg/m².        Left Knee Exam     Tenderness   The patient is experiencing tenderness in the medial retinaculum, medial joint line, lateral retinaculum, lateral joint line and patella.    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      Imaging: no new    Assessment:        1. Post-traumatic osteoarthritis of left knee    2. Effusion of left knee           Plan:      Large Joint Arthrocentesis: L knee  Date/Time: 11/16/2023 11:04 AM  Consent given by: patient  Site marked: site marked  Timeout: Immediately prior to procedure a time out was called to verify the correct patient, procedure, equipment, support staff and site/side marked as required   Supporting Documentation  Indications: pain and joint swelling   Procedure Details  Location: knee - L knee  Preparation: Patient was prepped and draped in the usual sterile fashion  Needle size: 18 G  Approach: superior  Aspirate amount: 110 mL  Aspirate: yellow and cloudy  Patient tolerance: patient tolerated the procedure well with no immediate complications          Discussed treatment options at length with patient at today's visit.  I discussed with the patient that at this point time I would recommend aspiration of his knee.  Aspiration returned roughly 110 cc of yellow cloudy fluid.  Because of this I elected to send the fluid to the lab for Gram stain cell count crystals and culture.  Additionally I think it is imperative that we order an MRI of the patient's left knee to evaluate the integrity of the retinaculum and determine whether or not he has true effusion or if this is some pain he is fluid leaking out from the inside of his knee.  Defect.  I discussed with him that I like him to cut down on his drinking.  I will plan to see the patient back in 4 weeks and if at that point time he is still doing poorly we may consider left total knee arthroplasty.  I would like  the patient to stop drinking or significantly cut down before I would consider total knee arthroplasty.  Follow up: 4 weeks with 4 standing views of the left knee      Surjit Graham was in agreement with plan and had all questions answered.     Medications:  No orders of the defined types were placed in this encounter.      Followup:  No follow-ups on file.    Diagnoses and all orders for this visit:    1. Post-traumatic osteoarthritis of left knee (Primary)  -     Body Fluid Cell Count With Differential - Synovial Fluid, Knee, Left; Future  -     Gram Stain - No Culture - , Knee, Left; Future  -     Crystal Exam, Fluid - Synovial Fluid,; Future  -     Body Fluid Culture - Body Fluid, Knee, Left; Future  -     Anaerobic Culture - Aspirate, Knee, Left; Future  -     Culture, Routine - Swab, Knee, Left; Future  -     MRI Knee Left Without Contrast; Future    2. Effusion of left knee  -     Body Fluid Cell Count With Differential - Synovial Fluid, Knee, Left; Future  -     Gram Stain - No Culture - , Knee, Left; Future  -     Crystal Exam, Fluid - Synovial Fluid,; Future  -     Body Fluid Culture - Body Fluid, Knee, Left; Future  -     Anaerobic Culture - Aspirate, Knee, Left; Future  -     Culture, Routine - Swab, Knee, Left; Future  -     MRI Knee Left Without Contrast; Future    Other orders  -     Large Joint Arthrocentesis: L knee          Dictated utilizing Dragon dictation

## 2023-11-17 ENCOUNTER — OFFICE VISIT (OUTPATIENT)
Dept: INTERNAL MEDICINE | Facility: CLINIC | Age: 63
End: 2023-11-17
Payer: COMMERCIAL

## 2023-11-17 VITALS
BODY MASS INDEX: 29.77 KG/M2 | OXYGEN SATURATION: 99 % | WEIGHT: 201 LBS | HEIGHT: 69 IN | DIASTOLIC BLOOD PRESSURE: 64 MMHG | SYSTOLIC BLOOD PRESSURE: 132 MMHG | TEMPERATURE: 98 F | HEART RATE: 103 BPM

## 2023-11-17 DIAGNOSIS — Z79.1 ENCOUNTER FOR MONITORING CHRONIC NSAID THERAPY: ICD-10-CM

## 2023-11-17 DIAGNOSIS — F33.1 MODERATE EPISODE OF RECURRENT MAJOR DEPRESSIVE DISORDER: ICD-10-CM

## 2023-11-17 DIAGNOSIS — M17.12 PRIMARY OSTEOARTHRITIS OF LEFT KNEE: Primary | ICD-10-CM

## 2023-11-17 DIAGNOSIS — Z51.81 ENCOUNTER FOR MONITORING CHRONIC NSAID THERAPY: ICD-10-CM

## 2023-11-17 RX ORDER — CELECOXIB 100 MG/1
100 CAPSULE ORAL 2 TIMES DAILY PRN
Qty: 180 CAPSULE | Refills: 0 | Status: SHIPPED | OUTPATIENT
Start: 2023-11-17

## 2023-11-17 NOTE — PROGRESS NOTES
"Surjit Stevenson is a 63 y.o. male who presents with a chief complaint of   Chief Complaint   Patient presents with    Osteoarthritis     4 mo f/u, having a lot of pain in left leg, saw ortho yesterday and had fluid drained.        HPI     Knee pain has been worth recently.      The following portions of the patient's history were reviewed and updated as appropriate: allergies, current medications, past family history, past medical history, past social history, past surgical history and problem list.      Current Outpatient Medications:     acetaminophen (TYLENOL) 500 MG tablet, Take 1 tablet by mouth Every 6 (Six) Hours As Needed for Mild Pain., Disp: , Rfl:     naproxen (Naprosyn) 500 MG tablet, Take 1 tablet by mouth 2 (Two) Times a Day With Meals., Disp: 60 tablet, Rfl: 2    celecoxib (CeleBREX) 100 MG capsule, Take 1 capsule by mouth 2 (Two) Times a Day As Needed for Moderate Pain., Disp: 180 capsule, Rfl: 0            Physical Exam  /64 (BP Location: Left arm, Patient Position: Sitting, Cuff Size: Adult)   Pulse 103   Temp 98 °F (36.7 °C) (Infrared)   Ht 175.3 cm (69.02\")   Wt 91.2 kg (201 lb)   SpO2 99%   BMI 29.67 kg/m²     Physical Exam  Vitals reviewed.   Constitutional:       General: He is not in acute distress.     Appearance: Normal appearance.   HENT:      Head: Normocephalic and atraumatic.      Nose: Nose normal.      Mouth/Throat:      Mouth: Mucous membranes are moist.   Eyes:      Conjunctiva/sclera: Conjunctivae normal.   Cardiovascular:      Rate and Rhythm: Normal rate and regular rhythm.      Pulses: Normal pulses.      Heart sounds: Normal heart sounds.      Comments: Rhythm has additional beat every few beats  Pulmonary:      Effort: Pulmonary effort is normal.      Breath sounds: Normal breath sounds.   Abdominal:      Palpations: Abdomen is soft.      Tenderness: There is no abdominal tenderness.   Musculoskeletal:      Right lower leg: No edema.      Left lower leg: No " edema.   Skin:     General: Skin is warm and dry.   Neurological:      General: No focal deficit present.      Mental Status: He is alert.   Psychiatric:         Mood and Affect: Mood normal.           Results for orders placed or performed in visit on 11/16/23   Body Fluid Culture - Body Fluid, Knee, Left    Specimen: Knee, Left; Body Fluid   Result Value Ref Range    Gram Stain Few (2+) WBCs seen     Gram Stain No organisms seen    Crystal Exam, Fluid - Synovial Fluid,    Specimen: Synovial Fluid   Result Value Ref Range    Crystals, Fluid No crystals seen    Body fluid cell count - Synovial Fluid, Knee, Left    Specimen: Knee, Left; Synovial Fluid   Result Value Ref Range    Color, Fluid Yellow     Appearance, Fluid Cloudy (A) Clear    WBC, Fluid 20,980 /mm3    RBC, Fluid 6,000 /mm3   Body fluid differential - Synovial Fluid, Knee, Left    Specimen: Knee, Left; Synovial Fluid   Result Value Ref Range    Neutrophils, Fluid 87 %    Lymphocytes, Fluid 13 %    Monocytes, Fluid 0 %    Eosinophils, Fluid 0 %    Basophils, Fluid 0 %    Mononuclear, Fluid 0 %    Other Cells/100 WBCs, Fluid 0 /100 WBCs    Plasma Cells, Fluid 0 %    Blasts, Fluid 0 %    nRBC, Fluid 0 /100 WBCs    Unclassified Cells, Fluid 0 %    Mesothelial Cells, Fluid 0 %    Macrophage, Fluid 0 %           Diagnoses and all orders for this visit:    1. Primary osteoarthritis of left knee (Primary)  -     Comprehensive Metabolic Panel  -     celecoxib (CeleBREX) 100 MG capsule; Take 1 capsule by mouth 2 (Two) Times a Day As Needed for Moderate Pain.  Dispense: 180 capsule; Refill: 0    2. Encounter for monitoring chronic NSAID therapy  -     Comprehensive Metabolic Panel    3. Moderate episode of recurrent major depressive disorder      Upgrade from Naproxen to Celebrex and check CMP to monitor parvez function.     Mental payton doing better.  Stable off Celexa.

## 2023-11-19 LAB — BACTERIA SPEC ANAEROBE CULT: NORMAL

## 2023-11-21 ENCOUNTER — TELEPHONE (OUTPATIENT)
Dept: ORTHOPEDIC SURGERY | Facility: CLINIC | Age: 63
End: 2023-11-21
Payer: COMMERCIAL

## 2023-11-21 NOTE — TELEPHONE ENCOUNTER
Spoke with patient. Informed patient, I am working on his paperwork but hope to have it completed by Monday. I will give him a call as soon as it is finished. Patient verbally understood.

## 2023-11-21 NOTE — TELEPHONE ENCOUNTER
Caller: Surjit Stevenson    Relationship to patient: Self    Best call back number: [phone] (home)     Patient is needing: PATIENT STATED THAT HE DROPPED OFF FMLA AND SHORT TERM LEAVE PAPERWORK DURING HIS VISIT WITH [facility] ON 11/16/23. HIS EMPLOYER HAS NOT RECEIVED THEM YET AND THE PATIENT IS WANTING AN UPDATE ON THEM.   PLEASE GIVE A CALL TO THE PATIENT AND UPDATE HIM ON THIS

## 2023-11-22 ENCOUNTER — TELEPHONE (OUTPATIENT)
Dept: ORTHOPEDIC SURGERY | Facility: CLINIC | Age: 63
End: 2023-11-22
Payer: COMMERCIAL

## 2023-11-22 NOTE — TELEPHONE ENCOUNTER
Attempted to call patient about paperwork. We can not fill out his paperwork, due to him not being off work, or being scheduled for surgery. Asked patient to call the office back if he had any questions.

## 2023-11-30 ENCOUNTER — TELEPHONE (OUTPATIENT)
Dept: ORTHOPEDIC SURGERY | Facility: CLINIC | Age: 63
End: 2023-11-30
Payer: COMMERCIAL

## 2023-11-30 NOTE — TELEPHONE ENCOUNTER
PATIENT CAME IN THE OFFICE TODAY WANTING A NOTE TO RTW.  AFTER DOING SOME INVESTIGATING IT WAS NOTED THAT WE NEVER HAD THE PATIENT OFF WORK.  PATIENT SAID HE WAS IN SUCH PAIN WHEN HE CAME FOR HIS LAST OFFICE VISIT AND TOLD DR SMITH THE PAIN WAS AFFECTING HIS JOB THAT HE THOUGHT HE WAS SUPPOSED TO BE OFF. I SPOKE WITH DR SMITH AND HE SAID HE WOULD AGREE TO GIVE HIM A NOTE STATING HE WAS OFF 11-17-23 THRU 11-30-23 WITH RTW 12-1-23  
routine postpartum care and discharge when stable

## 2023-11-30 NOTE — TELEPHONE ENCOUNTER
Patient called into the office today about his [facility] paperwork. I advised the patient that because he is not currently off work and that  will not schedule surgery for him at this time, that we can not fill out his paperwork. The payment that we received for this form, will be put towards his bill for [facility] He informed me that he was very unsatisfied with the office regarding this situation and he would look for care elsewhere.

## 2023-12-01 ENCOUNTER — TELEPHONE (OUTPATIENT)
Dept: INTERNAL MEDICINE | Facility: CLINIC | Age: 63
End: 2023-12-01

## 2023-12-01 NOTE — TELEPHONE ENCOUNTER
Caller: Surjit Stevenson    Relationship: Self    Best call back number: [phone]    Who are you requesting to speak with (clinical staff, provider,  specific staff member): DR. SORENSEN OR MA    What was the call regarding: PATIENT STATED THAT THEY HAVE SOME QUESTIONS IN REGARDS TO HIS KNEE REPLACEMENT SURGERY. STATED THAT HE HAS A LOT OF QUESTIONS THAT MAY LEAD TO MORE DEPENDING ON ANSWERS. STATED THAT HE NEEDS TO KNOW WHO DOES WHAT FOR HIM BETWEEN DR. SORENSEN AND DR. SMITH. STATED THAT HE HAS BEEN TOLD TO GO BACK TO WORK ON 12/4/23 BY DR. SMITH AND THAT HIS KNEE IS STILL SWOLLEN. STATED THAT THE PAIN HAS SUBSIDED A LITTLE BUT THAT HE BELIEVES IT IS BECAUSE OF THE RESTING. STATED THAT HE CANNOT GET FMLA FROM DR. SMITH UNTIL THE SURGERY IS SCHEDULED. PLEASE CALL AND ADVISE ON FURTHER ACTION

## 2023-12-07 ENCOUNTER — OFFICE VISIT (OUTPATIENT)
Dept: INTERNAL MEDICINE | Facility: CLINIC | Age: 63
End: 2023-12-07
Payer: COMMERCIAL

## 2023-12-07 VITALS
DIASTOLIC BLOOD PRESSURE: 90 MMHG | SYSTOLIC BLOOD PRESSURE: 128 MMHG | BODY MASS INDEX: 30.18 KG/M2 | WEIGHT: 203.8 LBS | OXYGEN SATURATION: 100 % | HEART RATE: 91 BPM | TEMPERATURE: 98.2 F | HEIGHT: 69 IN

## 2023-12-07 DIAGNOSIS — M17.12 PRIMARY OSTEOARTHRITIS OF LEFT KNEE: Primary | ICD-10-CM

## 2023-12-07 PROCEDURE — 99213 OFFICE O/P EST LOW 20 MIN: CPT | Performed by: INTERNAL MEDICINE

## 2023-12-07 NOTE — PROGRESS NOTES
"Surjit Stevenson is a 63 y.o. male who presents with a chief complaint of   Chief Complaint   Patient presents with    Osteoarthritis       HPI     Fluid pulled on 11/16 and has re-accumulated somewhat already.  Celebrex has helped him.  He will see Dr. Dowling on 12/14.  Explained why he needs the MRI and about why he needs to continue cutting back on drinking.       The following portions of the patient's history were reviewed and updated as appropriate: allergies, current medications, past family history, past medical history, past social history, past surgical history and problem list.      Current Outpatient Medications:     acetaminophen (TYLENOL) 500 MG tablet, Take 1 tablet by mouth Every 6 (Six) Hours As Needed for Mild Pain., Disp: , Rfl:     celecoxib (CeleBREX) 100 MG capsule, Take 1 capsule by mouth 2 (Two) Times a Day As Needed for Moderate Pain., Disp: 180 capsule, Rfl: 0            Physical Exam  /90 (BP Location: Left arm, Patient Position: Sitting, Cuff Size: Adult)   Pulse 91   Temp 98.2 °F (36.8 °C) (Infrared)   Ht 175.3 cm (69\")   Wt 92.4 kg (203 lb 12.8 oz)   SpO2 100%   BMI 30.10 kg/m²     Physical Exam  Vitals reviewed.   Constitutional:       General: He is not in acute distress.     Appearance: Normal appearance.   HENT:      Head: Normocephalic and atraumatic.      Nose: Nose normal.      Mouth/Throat:      Mouth: Mucous membranes are moist.   Eyes:      Conjunctiva/sclera: Conjunctivae normal.   Pulmonary:      Effort: Pulmonary effort is normal.   Musculoskeletal:         General: Swelling present.   Skin:     General: Skin is warm and dry.   Neurological:      General: No focal deficit present.      Mental Status: He is alert.   Psychiatric:         Mood and Affect: Mood normal.         Results for orders placed or performed in visit on 11/16/23   Body Fluid Culture - Body Fluid, Knee, Left    Specimen: Knee, Left; Body Fluid   Result Value Ref Range    Body Fluid Culture " No growth at 5 days     Gram Stain Few (2+) WBCs seen     Gram Stain No organisms seen    Anaerobic Culture - Aspirate, Knee, Left    Specimen: Knee, Left; Aspirate   Result Value Ref Range    Anaerobic Culture No anaerobes isolated at 5 days    Crystal Exam, Fluid - Synovial Fluid,    Specimen: Synovial Fluid   Result Value Ref Range    Crystals, Fluid No crystals seen    Body fluid cell count - Synovial Fluid, Knee, Left    Specimen: Knee, Left; Synovial Fluid   Result Value Ref Range    Color, Fluid Yellow     Appearance, Fluid Cloudy (A) Clear    WBC, Fluid 20,980 /mm3    RBC, Fluid 6,000 /mm3   Body fluid differential - Synovial Fluid, Knee, Left    Specimen: Knee, Left; Synovial Fluid   Result Value Ref Range    Neutrophils, Fluid 87 %    Lymphocytes, Fluid 13 %    Monocytes, Fluid 0 %    Eosinophils, Fluid 0 %    Basophils, Fluid 0 %    Mononuclear, Fluid 0 %    Other Cells/100 WBCs, Fluid 0 /100 WBCs    Plasma Cells, Fluid 0 %    Blasts, Fluid 0 %    nRBC, Fluid 0 /100 WBCs    Unclassified Cells, Fluid 0 %    Mesothelial Cells, Fluid 0 %    Macrophage, Fluid 0 %           Diagnoses and all orders for this visit:    1. Primary osteoarthritis of left knee (Primary)      Discussed reasons for MRI as well as need to continue cutting back on drinking which he is doing well with.  Celebrex is helping so we will continue with that.  I am happy to help him with FMLA pending that we have an over all plan for his knee as I do not generally do long term disability paperwork.

## 2023-12-15 ENCOUNTER — APPOINTMENT (OUTPATIENT)
Dept: OTHER | Facility: HOSPITAL | Age: 63
End: 2023-12-15
Payer: COMMERCIAL

## 2023-12-15 ENCOUNTER — HOSPITAL ENCOUNTER (OUTPATIENT)
Dept: MRI IMAGING | Facility: HOSPITAL | Age: 63
Discharge: HOME | End: 2023-12-15
Payer: COMMERCIAL

## 2023-12-15 DIAGNOSIS — M17.32 POST-TRAUMATIC OSTEOARTHRITIS OF LEFT KNEE: ICD-10-CM

## 2023-12-15 DIAGNOSIS — M25.462 EFFUSION OF LEFT KNEE: ICD-10-CM

## 2023-12-15 PROCEDURE — 73721 MRI JNT OF LWR EXTRE W/O DYE: CPT

## 2023-12-21 ENCOUNTER — OFFICE VISIT (OUTPATIENT)
Dept: ORTHOPEDIC SURGERY | Facility: CLINIC | Age: 63
End: 2023-12-21
Payer: COMMERCIAL

## 2023-12-21 ENCOUNTER — TELEPHONE (OUTPATIENT)
Dept: INTERNAL MEDICINE | Facility: CLINIC | Age: 63
End: 2023-12-21

## 2023-12-21 VITALS — HEIGHT: 69 IN | WEIGHT: 203 LBS | BODY MASS INDEX: 30.07 KG/M2

## 2023-12-21 DIAGNOSIS — M17.32 POST-TRAUMATIC OSTEOARTHRITIS OF LEFT KNEE: Primary | ICD-10-CM

## 2023-12-21 DIAGNOSIS — M25.462 EFFUSION OF LEFT KNEE: ICD-10-CM

## 2023-12-21 PROBLEM — M17.9 OA (OSTEOARTHRITIS) OF KNEE: Status: ACTIVE | Noted: 2023-12-21

## 2023-12-21 RX ORDER — CHLORHEXIDINE GLUCONATE 500 MG/1
CLOTH TOPICAL ONCE
OUTPATIENT
Start: 2023-12-21

## 2023-12-21 RX ORDER — MELOXICAM 7.5 MG/1
15 TABLET ORAL ONCE
OUTPATIENT
Start: 2023-12-21 | End: 2023-12-21

## 2023-12-21 RX ORDER — PREGABALIN 150 MG/1
150 CAPSULE ORAL ONCE
OUTPATIENT
Start: 2023-12-21 | End: 2023-12-21

## 2023-12-21 NOTE — TELEPHONE ENCOUNTER
Caller: Surjit Stevenson    Relationship: Self    Best call back number: [phone]     Who are you requesting to speak with (clinical staff, provider,  specific staff member): DR SORENSEN OR MA    What was the call regarding: PATIENT REQUESTS A CALL BACK TO DISCUSS FMLA PAPERWORK NEEDED FOR HIS JOB RELATED TO ONGOING KNEE PAIN PREVENTING HIM FROM PERFORMING HIS JOB.

## 2023-12-21 NOTE — PROGRESS NOTES
"Subjective:     Patient ID: Surjit Stevenson is a 63 y.o. male.    Chief Complaint:    History of Present Illness  Surjit Stevenson returns to clinic today for evaluation of left knee pain/OA.  Patient had a knee surgery when he was 20 for what appears to be a MCL and ACL tear.  He has subsequently developed severe posttraumatic osteoarthritis with gross ligamentous instability of his knee.  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  The pain is severe and debilitating.  He is undergone multiple aspirations and injections.  Nothing has fully alleviated his symptoms and he is hopeful to be considered for total knee arthroplasty at this point in time.     Social History     Occupational History    Not on file   Tobacco Use    Smoking status: Former     Packs/day: 1.00     Years: 10.00     Additional pack years: 0.00     Total pack years: 10.00     Types: Cigarettes     Quit date:      Years since quittin.9     Passive exposure: Past    Smokeless tobacco: Never    Tobacco comments:     quit 30 yrs ago; was 1 ppd for 11 yrs   Vaping Use    Vaping Use: Never used   Substance and Sexual Activity    Alcohol use: Yes     Comment: 10    Drug use: No    Sexual activity: Defer      History reviewed. No pertinent past medical history.  Past Surgical History:   Procedure Laterality Date    HERNIA REPAIR      KNEE SURGERY Bilateral     1980s       Family History   Problem Relation Age of Onset    Hypertension Mother     Prostate cancer Father     Kidney cancer Father                  Objective:  Vitals:    23 0756   Weight: 92.1 kg (203 lb)   Height: 175.3 cm (69\")         23  075   Weight: 92.1 kg (203 lb)     Body mass index is 29.98 kg/m².        Left Knee Exam     Tenderness   Left knee tenderness location: global moderate.    Range of Motion   Extension:  0   Flexion:  130     Tests   Varus: positive " Valgus: positive  Lachman:  Anterior - positive    Posterior - positive  Drawer:  Anterior - positive     Posterior - positive    Other   Erythema: absent  Scars: present  Sensation: normal  Pulse: present  Swelling: severe  Effusion: effusion present                 Imagin views of the left knee were ordered and reviewed by myself in the office today  Indication: left knee pain  Findings: X-rays demonstrate no acute osseous abnormality.  There is no signs of fracture dislocation or subluxation.  There is joint space narrowing, subchondral sclerosis, cystic changes, and periarticular osteophytes most pronounced in all 3 joints.  There is severe joint degeneration and the large effusion noted.  There are staples consistent with prior MCL and ACL reconstruction.  Comparative studies: MRI      MRI Knee Left Without Contrast    Result Date: 12/15/2023  Large joint effusion with extensive somewhat nodular synovitis. There is bowing of the knee joint capsule with mass effect on surrounding structures, concerning for fluid under pressure. Septic arthritis is the diagnosis of exclusion. Superimposed extensive tricompartmental degenerative changes including maceration and essentially complete substance loss of the menisci as well as full-thickness weightbearing articular cartilage loss of the medial and lateral compartments. Additional findings as above. Signer Name: Rohith Marte MD Signed: 12/15/2023 4:36 PM EST [facility]       Assessment:        1. Post-traumatic osteoarthritis of left knee    2. Effusion of left knee           Plan:          He has failed conservative management of left knee osteoarthritis including observation, brace wear, activity modification, multiple intra-articular injections, and surgery as a child..  We discussed further conservative treatments including but not limited to physical therapy, intra-articular injections, nonsteroidal anti-inflammatories, topical  creams, use of an assistive device, weight loss, and low impact exercises.  Hevoiced understanding of further nonoperative treatment options but He is interested in proceeding with knee replacement surgery.    The spectrum of treatment options were discussed with the patient in detail including both the nonoperative and operative treatment modalities and their respective risks and benefits.  After thorough discussion, the patient has elected to undergo surgical treatment.  The details of the surgical procedure were explained including the location of probable incisions and a description of the likely implants to be used.  Models and diagrams were used as educational resources. The patient understands the likely convalescence after surgery, as well as the rehabilitation required.  We thoroughly discussed the risks, benefits, and alternatives to surgery.  The risks include but are not limited to the risk of infection, joint stiffness, blood clots (including DVT and/or pulmonary embolus along with the risk of death), neurologic and/or vascular injury, fracture, dislocation, nonunion, malunion, need for further surgery including hardware failure requiring revision, and continued pain.  It was explained that if tissue has been repaired or reconstructed, there is also a chance of failure which may require further management.  Following the completion of the discussion, the patient expressed understanding of this planned course of care, all their questions were answered and consent will be obtained preoperatively.  I also reviewed the typical postoperative recovery of 6-12 months before maximal recovery, and possible need for rehabilitation stay after hospitalization. I also explained that in some series of patients in the research literature, up to 20% of patients are dissatisfied with their total knee arthroplasty. I also discussed the risks of of anesthesia. I also explained that she would meet with Anesthesiology  preoperatively to discuss anesthetic risk.  All questions were answered.     Plan for left total knee arthroplasty.    Implants: [employer] cemented Legion TKS with [employer] PS likely constrained  Anticoagulation: Asprin  Antibiotics: Cefazolin and Vanc  Admission Type: 23 HR Obs  TXA: Yes      Surjit Cauble was in agreement with plan and had all questions answered.     Medications:  No orders of the defined types were placed in this encounter.      Followup:  No follow-ups on file.    Diagnoses and all orders for this visit:    1. Post-traumatic osteoarthritis of left knee (Primary)  -     XR Knee 3+ View With [place] Left    2. Effusion of left knee  -     XR Knee 3+ View With [place] Left          Dictated utilizing Dragon dictation

## 2023-12-26 ENCOUNTER — PRE-ADMISSION TESTING (OUTPATIENT)
Dept: PREADMISSION TESTING | Facility: HOSPITAL | Age: 63
End: 2023-12-26
Payer: COMMERCIAL

## 2023-12-26 VITALS
BODY MASS INDEX: 29.19 KG/M2 | WEIGHT: 197.1 LBS | SYSTOLIC BLOOD PRESSURE: 129 MMHG | HEART RATE: 78 BPM | DIASTOLIC BLOOD PRESSURE: 76 MMHG | OXYGEN SATURATION: 99 % | HEIGHT: 69 IN | RESPIRATION RATE: 16 BRPM

## 2023-12-26 DIAGNOSIS — M17.32 POST-TRAUMATIC OSTEOARTHRITIS OF LEFT KNEE: ICD-10-CM

## 2023-12-26 DIAGNOSIS — M25.462 EFFUSION OF LEFT KNEE: Primary | ICD-10-CM

## 2023-12-26 LAB
ABO GROUP BLD: NORMAL
ABO GROUP BLD: NORMAL
ALBUMIN SERPL-MCNC: 3.6 G/DL (ref 3.5–5.2)
ALBUMIN/GLOB SERPL: 1.2 G/DL
ALP SERPL-CCNC: 126 U/L (ref 39–117)
ALT SERPL W P-5'-P-CCNC: 14 U/L (ref 1–41)
ANION GAP SERPL CALCULATED.3IONS-SCNC: 5.3 MMOL/L (ref 5–15)
AST SERPL-CCNC: 17 U/L (ref 1–40)
BASOPHILS # BLD AUTO: 0.03 10*3/MM3 (ref 0–0.2)
BASOPHILS NFR BLD AUTO: 0.5 % (ref 0–1.5)
BILIRUB SERPL-MCNC: 0.2 MG/DL (ref 0–1.2)
BLD GP AB SCN SERPL QL: NEGATIVE
BUN SERPL-MCNC: 17 MG/DL (ref 8–23)
BUN/CREAT SERPL: 19.3 (ref 7–25)
CALCIUM SPEC-SCNC: 9 MG/DL (ref 8.6–10.5)
CHLORIDE SERPL-SCNC: 100 MMOL/L (ref 98–107)
CO2 SERPL-SCNC: 28.7 MMOL/L (ref 22–29)
CREAT SERPL-MCNC: 0.88 MG/DL (ref 0.76–1.27)
DEPRECATED RDW RBC AUTO: 45.1 FL (ref 37–54)
EGFRCR SERPLBLD CKD-EPI 2021: 97.8 ML/MIN/1.73
EOSINOPHIL # BLD AUTO: 0.15 10*3/MM3 (ref 0–0.4)
EOSINOPHIL NFR BLD AUTO: 2.5 % (ref 0.3–6.2)
ERYTHROCYTE [DISTWIDTH] IN BLOOD BY AUTOMATED COUNT: 14.7 % (ref 12.3–15.4)
GLOBULIN UR ELPH-MCNC: 3.1 GM/DL
GLUCOSE SERPL-MCNC: 93 MG/DL (ref 65–99)
HBA1C MFR BLD: 5.4 % (ref 4.8–5.6)
HCT VFR BLD AUTO: 40.4 % (ref 37.5–51)
HGB BLD-MCNC: 12 G/DL (ref 13–17.7)
IMM GRANULOCYTES # BLD AUTO: 0.04 10*3/MM3 (ref 0–0.05)
IMM GRANULOCYTES NFR BLD AUTO: 0.7 % (ref 0–0.5)
LYMPHOCYTES # BLD AUTO: 0.58 10*3/MM3 (ref 0.7–3.1)
LYMPHOCYTES NFR BLD AUTO: 9.5 % (ref 19.6–45.3)
MCH RBC QN AUTO: 24.8 PG (ref 26.6–33)
MCHC RBC AUTO-ENTMCNC: 29.7 G/DL (ref 31.5–35.7)
MCV RBC AUTO: 83.6 FL (ref 79–97)
MONOCYTES # BLD AUTO: 0.42 10*3/MM3 (ref 0.1–0.9)
MONOCYTES NFR BLD AUTO: 6.9 % (ref 5–12)
NEUTROPHILS NFR BLD AUTO: 4.87 10*3/MM3 (ref 1.7–7)
NEUTROPHILS NFR BLD AUTO: 79.9 % (ref 42.7–76)
NRBC BLD AUTO-RTO: 0 /100 WBC (ref 0–0.2)
PLATELET # BLD AUTO: 250 10*3/MM3 (ref 140–450)
PMV BLD AUTO: 11.5 FL (ref 6–12)
POTASSIUM SERPL-SCNC: 4.6 MMOL/L (ref 3.5–5.2)
PROT SERPL-MCNC: 6.7 G/DL (ref 6–8.5)
RBC # BLD AUTO: 4.83 10*6/MM3 (ref 4.14–5.8)
RH BLD: POSITIVE
RH BLD: POSITIVE
SODIUM SERPL-SCNC: 134 MMOL/L (ref 136–145)
T&S EXPIRATION DATE: NORMAL
WBC NRBC COR # BLD AUTO: 6.09 10*3/MM3 (ref 3.4–10.8)

## 2023-12-26 PROCEDURE — 80053 COMPREHEN METABOLIC PANEL: CPT | Performed by: INTERNAL MEDICINE

## 2023-12-26 PROCEDURE — 86850 RBC ANTIBODY SCREEN: CPT | Performed by: INTERNAL MEDICINE

## 2023-12-26 PROCEDURE — 86901 BLOOD TYPING SEROLOGIC RH(D): CPT

## 2023-12-26 PROCEDURE — 86901 BLOOD TYPING SEROLOGIC RH(D): CPT | Performed by: INTERNAL MEDICINE

## 2023-12-26 PROCEDURE — 86900 BLOOD TYPING SEROLOGIC ABO: CPT | Performed by: INTERNAL MEDICINE

## 2023-12-26 PROCEDURE — 85025 COMPLETE CBC W/AUTO DIFF WBC: CPT | Performed by: INTERNAL MEDICINE

## 2023-12-26 PROCEDURE — 83036 HEMOGLOBIN GLYCOSYLATED A1C: CPT | Performed by: INTERNAL MEDICINE

## 2023-12-26 PROCEDURE — 86900 BLOOD TYPING SEROLOGIC ABO: CPT

## 2023-12-26 PROCEDURE — 36415 COLL VENOUS BLD VENIPUNCTURE: CPT

## 2023-12-26 PROCEDURE — 87081 CULTURE SCREEN ONLY: CPT | Performed by: INTERNAL MEDICINE

## 2023-12-26 NOTE — PAT
Pt here for PAT visit.  Pre-op tests completed, chg soap given, and instructions reviewed.  Instructed clears until 2 hrs prior to arrival time, voiced understanding. Patient denies any allergies to metals or jewelry. ERAS education and handout provided and reviewed with the patient. Patient is aware and in agreement with his treatment plan.

## 2023-12-26 NOTE — DISCHARGE INSTRUCTIONS
PRE-ADMISSION TESTING INSTRUCTIONS FOR ADULTS    Take these medications the morning of surgery with a small sip of water: None    Hold celebrex 5-7 days prior to surgery.    Do not take any insulin or diabetes medications the morning of surgery.      No aspirin, advil, aleve, ibuprofen, naproxen, diet pills, decongestants, or herbal/vitamins for a week prior to surgery.       Tylenol/Acetaminophen is okay to take if needed.    General Instructions:    DO NOT EAT SOLID FOOD AFTER MIDNIGHT THE NIGHT BEFORE SURGERY. No gum, mints, or hard candy after midnight the night before surgery.  You may drink clear liquids the day of surgery up until 2 hours before your arrival time.  Clear liquids are liquids you can see through. Nothing RED in color.    Plain water    Sports drinks      Gelatin (Jell-O)  Fruit juices without pulp such as white grape juice and apple juice  Popsicles that contain no fruit or yogurt  Tea or coffee (no cream or milk added)    It is beneficial for you to have a clear drink that contains carbohydrates 2 hours before your arrival time.  We suggest a 20 ounce bottle of Gatorade or Powerade for non-diabetic patients or a 20 ounce bottle of Gatorade Zero or Powerade Zero for diabetic patients.     Patients who avoid smoking, chewing tobacco and alcohol for 4 weeks prior to surgery have a reduced risk of post-operative complications.  If at all possible, quit smoking as many days before surgery as you can.    Do not smoke, use chewing tobacco or drink alcohol the day of surgery    Bring your C-PAP/ BI-PAP machine if you use one.  Wear clean comfortable clothes.  Do not wear contact lenses, lotion, deodorant, or make-up.  Bring a case for your glasses if applicable. You may brush your teeth the morning of surgery.  You may wear dentures/partials, do not put adhesive/glue on them.  Leave all other jewelry and valuables at home.      Preventing a Surgical Site Infection:    Shower the night before and on  the morning of surgery using the chlorhexidine soap you were given.  Use a clean washcloth with the soap.  Place clean sheets on your bed after showering the night before surgery. Do not use the CHG soap on your hair, face, or private areas. Wash your body gently for five (5) minutes. Do not scrub your skin.  Dry with a clean towel and dress in clean clothing.  Do not shave the surgical area for 10 days-2 weeks prior to surgery  because the razor can irritate skin and make it easier to develop an infection.  Make sure you, your family, and all healthcare providers clean their hands with soap and water or an alcohol based hand  before caring for you or your wound.      Day of surgery:    Your surgeon’s office will advise you of your arrival time for the day of surgery.    Upon arrival, a Pre-op nurse and Anesthesia provider will review your health history, obtain vital signs, and answer questions you may have. The anesthesia provider will also discuss the type of anesthesia that will be needed for your procedure, which may include general anesthesia. The only belongings needed at this time will be your home medications and if applicable your C-PAP/BI-PAP machine.  If you are staying overnight your family can leave the rest of your belongings in the car and bring them to your room later.  A Pre-op nurse will start an IV and you may receive medication in preparation for surgery, including something to help you relax.  Your family will be able to see you in the Pre-op area.  While you are in surgery your family should notify the waiting room  if they leave the waiting room area and provide a contact phone number.    IF you have any questions, you can call the Pre-Admission Department at [phone] or your surgeon's office.  Notify your surgeon if  you become sick, have a fever, productive cough, or cannot be here the day of surgery    Please be aware that surgery does come with discomfort.  We  want to make every effort to control your discomfort so please discuss any uncontrolled symptoms with your nurse.   Your doctor will most likely have prescribed pain medications.      If you are going home after surgery, you will receive individualized written care instructions before being discharged.  A responsible adult (over the age of 18) must drive you to and from the hospital on the day of your surgery and stay with you for 24 hours after anesthesia.    If you are staying overnight following surgery, you will be transported to your hospital room following the recovery period.  [facility] has all private rooms.    You may receive a survey regarding the care you received. Your feedback is very important and will be used to collect the necessary data to help us to continue to provide excellent care.     Deductibles and co-payments are collected on the day of service. Please be prepared to pay the required co-pay, deductible or deposit on the day of service as defined by your plan.

## 2023-12-27 LAB — MRSA SPEC QL CULT: NORMAL

## 2023-12-28 ENCOUNTER — OFFICE VISIT (OUTPATIENT)
Dept: INTERNAL MEDICINE | Facility: CLINIC | Age: 63
End: 2023-12-28
Payer: COMMERCIAL

## 2023-12-28 VITALS
HEART RATE: 71 BPM | SYSTOLIC BLOOD PRESSURE: 124 MMHG | DIASTOLIC BLOOD PRESSURE: 70 MMHG | TEMPERATURE: 97 F | HEIGHT: 69 IN | WEIGHT: 198 LBS | BODY MASS INDEX: 29.33 KG/M2 | OXYGEN SATURATION: 96 %

## 2023-12-28 DIAGNOSIS — E87.1 HYPONATREMIA: ICD-10-CM

## 2023-12-28 DIAGNOSIS — Z01.818 ENCOUNTER FOR PREOPERATIVE ASSESSMENT: Primary | ICD-10-CM

## 2023-12-28 DIAGNOSIS — D64.9 ANEMIA, UNSPECIFIED TYPE: ICD-10-CM

## 2023-12-28 DIAGNOSIS — I49.3 PVC (PREMATURE VENTRICULAR CONTRACTION): ICD-10-CM

## 2023-12-28 PROCEDURE — 99214 OFFICE O/P EST MOD 30 MIN: CPT | Performed by: NURSE PRACTITIONER

## 2023-12-28 PROCEDURE — 93000 ELECTROCARDIOGRAM COMPLETE: CPT | Performed by: NURSE PRACTITIONER

## 2023-12-28 NOTE — PROGRESS NOTES
Tatiana Stevenson is a 63 y.o. male presenting today for follow up of   Chief Complaint   Patient presents with    Pre-op Exam     This patient is new to me. His PCP is Dr. Tomlinson.    History of Present Illness     Patient Active Problem List   Diagnosis    Primary osteoarthritis of left knee    Moderate episode of recurrent major depressive disorder    OA (osteoarthritis) of knee    Effusion of left knee       Outpatient Medications Marked as Taking for the 12/28/23 encounter (Office Visit) with Priyanka Schaeffer APRN   Medication Sig Dispense Refill    acetaminophen (TYLENOL) 500 MG tablet Take 1 tablet by mouth Every 6 (Six) Hours As Needed for Mild Pain.      celecoxib (CeleBREX) 100 MG capsule Take 1 capsule by mouth 2 (Two) Times a Day As Needed for Moderate Pain. 180 capsule 0       TKR L knee w/ Dr. Cerda 01/08/2024.    Pt reports heavy ETOH use over the last 5yrs. He reports he stopped ETOH entirely approx 1mo ago.      The following portions of the patient's history were reviewed and updated as appropriate: allergies, current medications, past family history, past medical history, past social history, past surgical history and problem list.    Review of Systems   Constitutional:  Negative for chills, diaphoresis, fever, unexpected weight gain and unexpected weight loss.   HENT:  Negative for trouble swallowing.    Respiratory:  Positive for cough (chronic, occupational). Negative for shortness of breath.    Cardiovascular:  Negative for chest pain and palpitations.   Gastrointestinal:  Negative for abdominal pain, constipation, diarrhea, nausea and vomiting.   Genitourinary:  Negative for difficulty urinating.   Musculoskeletal:  Positive for arthralgias.   Neurological:  Negative for dizziness, weakness, numbness and headache.       Objective   Vitals:    12/28/23 0814   BP: 124/70   BP Location: Left arm   Patient Position: Sitting   Cuff Size: Adult   Pulse: 71   Temp: 97 °F (36.1 °C)  "  TempSrc: Infrared   SpO2: 96%   Weight: 89.8 kg (198 lb)   Height: 175.3 cm (69.02\")       BP Readings from Last 3 Encounters:   12/28/23 124/70   12/26/23 129/76   12/07/23 128/90        Wt Readings from Last 3 Encounters:   12/28/23 89.8 kg (198 lb)   12/26/23 89.4 kg (197 lb 1.6 oz)   12/21/23 92.1 kg (203 lb)        Body mass index is 29.23 kg/m².  Nursing notes and vitals reviewed.    Physical Exam  Constitutional:       General: He is not in acute distress.     Appearance: Normal appearance. He is well-developed.   HENT:      Head: Normocephalic.      Right Ear: Hearing, tympanic membrane, ear canal and external ear normal.      Left Ear: Hearing, tympanic membrane, ear canal and external ear normal.      Nose: Nose normal. No mucosal edema or rhinorrhea.      Mouth/Throat:      Mouth: Mucous membranes are moist.      Pharynx: Oropharynx is clear. Uvula midline.   Eyes:      General: Lids are normal.      Extraocular Movements: Extraocular movements intact.      Conjunctiva/sclera: Conjunctivae normal.      Pupils: Pupils are equal, round, and reactive to light.   Neck:      Thyroid: No thyroid mass or thyromegaly.   Cardiovascular:      Rate and Rhythm: Regular rhythm.      Pulses: Normal pulses.      Heart sounds: S1 normal and S2 normal. No murmur heard.     No friction rub. No gallop.   Pulmonary:      Effort: Pulmonary effort is normal.      Breath sounds: Normal breath sounds. No wheezing, rhonchi or rales.   Abdominal:      General: Bowel sounds are normal.      Palpations: Abdomen is soft.      Tenderness: There is no abdominal tenderness. There is no guarding.      Hernia: No hernia is present.   Musculoskeletal:         General: No deformity. Normal range of motion.      Cervical back: Normal range of motion and neck supple.   Lymphadenopathy:      Cervical: No cervical adenopathy.   Skin:     General: Skin is warm and dry.      Findings: No lesion or rash.   Neurological:      General: No focal " deficit present.      Mental Status: He is alert and oriented to person, place, and time.      Cranial Nerves: No cranial nerve deficit.      Sensory: No sensory deficit.      Motor: Motor function is intact.      Coordination: Coordination is intact.      Gait: Gait normal.      Deep Tendon Reflexes: Reflexes are normal and symmetric.   Psychiatric:         Attention and Perception: He is attentive.         Mood and Affect: Mood and affect normal.         Speech: Speech normal.         Behavior: Behavior normal.         Thought Content: Thought content normal.         Recent Results (from the past 672 hour(s))   ABORH 2ND SPECIMEN VERIFICATION    Collection Time: 12/26/23  1:15 PM    Specimen: Blood   Result Value Ref Range    ABO Type A     RH type Positive    Type and screen    Collection Time: 12/26/23  1:35 PM    Specimen: Blood   Result Value Ref Range    ABO Type A     RH type Positive     Antibody Screen Negative     T&S Expiration Date 1/9/2024 11:59:00 PM    Hemoglobin A1c    Collection Time: 12/26/23  1:35 PM    Specimen: Blood   Result Value Ref Range    Hemoglobin A1C 5.40 4.80 - 5.60 %   MRSA Screen Culture (Outpatient) - Swab, Nares    Collection Time: 12/26/23  1:35 PM    Specimen: Nares; Swab   Result Value Ref Range    MRSA Screen Cx       No Methicillin Resistant Staphylococcus aureus isolated   CBC Auto Differential    Collection Time: 12/26/23  1:35 PM    Specimen: Blood   Result Value Ref Range    WBC 6.09 3.40 - 10.80 10*3/mm3    RBC 4.83 4.14 - 5.80 10*6/mm3    Hemoglobin 12.0 (L) 13.0 - 17.7 g/dL    Hematocrit 40.4 37.5 - 51.0 %    MCV 83.6 79.0 - 97.0 fL    MCH 24.8 (L) 26.6 - 33.0 pg    MCHC 29.7 (L) 31.5 - 35.7 g/dL    RDW 14.7 12.3 - 15.4 %    RDW-SD 45.1 37.0 - 54.0 fl    MPV 11.5 6.0 - 12.0 fL    Platelets 250 140 - 450 10*3/mm3    Neutrophil % 79.9 (H) 42.7 - 76.0 %    Lymphocyte % 9.5 (L) 19.6 - 45.3 %    Monocyte % 6.9 5.0 - 12.0 %    Eosinophil % 2.5 0.3 - 6.2 %    Basophil % 0.5  0.0 - 1.5 %    Immature Grans % 0.7 (H) 0.0 - 0.5 %    Neutrophils, Absolute 4.87 1.70 - 7.00 10*3/mm3    Lymphocytes, Absolute 0.58 (L) 0.70 - 3.10 10*3/mm3    Monocytes, Absolute 0.42 0.10 - 0.90 10*3/mm3    Eosinophils, Absolute 0.15 0.00 - 0.40 10*3/mm3    Basophils, Absolute 0.03 0.00 - 0.20 10*3/mm3    Immature Grans, Absolute 0.04 0.00 - 0.05 10*3/mm3    nRBC 0.0 0.0 - 0.2 /100 WBC   Comprehensive Metabolic Panel    Collection Time: 12/26/23  1:36 PM    Specimen: Blood   Result Value Ref Range    Glucose 93 65 - 99 mg/dL    BUN 17 8 - 23 mg/dL    Creatinine 0.88 0.76 - 1.27 mg/dL    Sodium 134 (L) 136 - 145 mmol/L    Potassium 4.6 3.5 - 5.2 mmol/L    Chloride 100 98 - 107 mmol/L    CO2 28.7 22.0 - 29.0 mmol/L    Calcium 9.0 8.6 - 10.5 mg/dL    Total Protein 6.7 6.0 - 8.5 g/dL    Albumin 3.6 3.5 - 5.2 g/dL    ALT (SGPT) 14 1 - 41 U/L    AST (SGOT) 17 1 - 40 U/L    Alkaline Phosphatase 126 (H) 39 - 117 U/L    Total Bilirubin 0.2 0.0 - 1.2 mg/dL    Globulin 3.1 gm/dL    A/G Ratio 1.2 g/dL    BUN/Creatinine Ratio 19.3 7.0 - 25.0    Anion Gap 5.3 5.0 - 15.0 mmol/L    eGFR 97.8 >60.0 mL/min/1.73     FOLATE (04/12/2022 08:35)   VITAMIN B12 (04/12/2022 08:35)       ECG 12 Lead    Date/Time: 12/28/2023 11:03 AM  Performed by: Priyanka Schaeffer APRN    Authorized by: Priyanka Schaeffer APRN  Comparison: not compared with previous ECG   Previous ECG: no previous ECG available  Rhythm: sinus rhythm  Ectopy comments: PVCs  BPM: 65  Conduction: conduction normal  QRS axis: normal    Clinical impression: non-specific ECG            Assessment & Plan   Diagnoses and all orders for this visit:    1. Encounter for preoperative assessment (Primary)  -     CBC & Differential  -     Comprehensive Metabolic Panel  -     Ferritin  -     Iron Profile  -     Magnesium  -     Folate  -     Vitamin B12    2. Anemia, unspecified type  -     CBC & Differential  -     Comprehensive Metabolic Panel  -     Ferritin  -     Iron  Profile  -     Magnesium  -     Folate  -     Vitamin B12    3. Hyponatremia  -     Comprehensive Metabolic Panel    4. PVC (premature ventricular contraction)  -     Holter monitor - 24 hour    Other orders  -     ECG 12 Lead      2. Anemia appears to be a new finding. I suspect this could be d/t his h/o ETOH consumption as he had relatively low levels of both Vit B12 and Folate in the past. These will be reassessed today. I advised him to begin a QD MVI.    3. Mild but will recheck.    4. New finding. Asymptomatic.  Possibly d/t electrolyte disturbances 2/2 ETOH?  Holter ASAP.      Pt will require additional labs as well as holter monitor prior to his planned procedure.    The plan of care was discussed. All questions were answered. Patient verbalized understanding.      Return needs pre-op visit w/ Dr. Tomlinson in one week.      I spent 32 minutes caring for Surjit on this date of service. This time includes time spent by me in the following activities:preparing for the visit, reviewing tests, performing a medically appropriate examination and/or evaluation , counseling and educating the patient/family/caregiver, ordering medications, tests, or procedures, referring and communicating with other health care professionals , documenting information in the medical record, independently interpreting results and communicating that information with the patient/family/caregiver, and care coordination

## 2023-12-29 LAB
ALBUMIN SERPL-MCNC: 4 G/DL (ref 3.5–5.2)
ALBUMIN/GLOB SERPL: 1.2 G/DL
ALP SERPL-CCNC: 131 U/L (ref 39–117)
ALT SERPL-CCNC: 20 U/L (ref 1–41)
AST SERPL-CCNC: 18 U/L (ref 1–40)
BASOPHILS # BLD AUTO: 0.05 10*3/MM3 (ref 0–0.2)
BASOPHILS NFR BLD AUTO: 0.8 % (ref 0–1.5)
BILIRUB SERPL-MCNC: 0.3 MG/DL (ref 0–1.2)
BUN SERPL-MCNC: 15 MG/DL (ref 8–23)
BUN/CREAT SERPL: 15.2 (ref 7–25)
CALCIUM SERPL-MCNC: 9.2 MG/DL (ref 8.6–10.5)
CHLORIDE SERPL-SCNC: 102 MMOL/L (ref 98–107)
CO2 SERPL-SCNC: 25.9 MMOL/L (ref 22–29)
CREAT SERPL-MCNC: 0.99 MG/DL (ref 0.76–1.27)
EGFRCR SERPLBLD CKD-EPI 2021: 86.7 ML/MIN/1.73
EOSINOPHIL # BLD AUTO: 0.09 10*3/MM3 (ref 0–0.4)
EOSINOPHIL NFR BLD AUTO: 1.4 % (ref 0.3–6.2)
ERYTHROCYTE [DISTWIDTH] IN BLOOD BY AUTOMATED COUNT: 13.9 % (ref 12.3–15.4)
FERRITIN SERPL-MCNC: 227 NG/ML (ref 30–400)
FOLATE SERPL-MCNC: 3.57 NG/ML (ref 4.78–24.2)
GLOBULIN SER CALC-MCNC: 3.3 GM/DL
GLUCOSE SERPL-MCNC: 99 MG/DL (ref 65–99)
HCT VFR BLD AUTO: 39.2 % (ref 37.5–51)
HGB BLD-MCNC: 12.6 G/DL (ref 13–17.7)
IMM GRANULOCYTES # BLD AUTO: 0.02 10*3/MM3 (ref 0–0.05)
IMM GRANULOCYTES NFR BLD AUTO: 0.3 % (ref 0–0.5)
IRON SATN MFR SERPL: 5 % (ref 20–50)
IRON SERPL-MCNC: 18 MCG/DL (ref 59–158)
LYMPHOCYTES # BLD AUTO: 0.55 10*3/MM3 (ref 0.7–3.1)
LYMPHOCYTES NFR BLD AUTO: 8.3 % (ref 19.6–45.3)
MAGNESIUM SERPL-MCNC: 2.1 MG/DL (ref 1.6–2.4)
MCH RBC QN AUTO: 25.3 PG (ref 26.6–33)
MCHC RBC AUTO-ENTMCNC: 32.1 G/DL (ref 31.5–35.7)
MCV RBC AUTO: 78.7 FL (ref 79–97)
MONOCYTES # BLD AUTO: 0.52 10*3/MM3 (ref 0.1–0.9)
MONOCYTES NFR BLD AUTO: 7.8 % (ref 5–12)
NEUTROPHILS # BLD AUTO: 5.42 10*3/MM3 (ref 1.7–7)
NEUTROPHILS NFR BLD AUTO: 81.4 % (ref 42.7–76)
NRBC BLD AUTO-RTO: 0 /100 WBC (ref 0–0.2)
PLATELET # BLD AUTO: 243 10*3/MM3 (ref 140–450)
POTASSIUM SERPL-SCNC: 5.2 MMOL/L (ref 3.5–5.2)
PROT SERPL-MCNC: 7.3 G/DL (ref 6–8.5)
RBC # BLD AUTO: 4.98 10*6/MM3 (ref 4.14–5.8)
SODIUM SERPL-SCNC: 138 MMOL/L (ref 136–145)
TIBC SERPL-MCNC: 332 MCG/DL
UIBC SERPL-MCNC: 314 MCG/DL (ref 112–346)
VIT B12 SERPL-MCNC: 433 PG/ML (ref 211–946)
WBC # BLD AUTO: 6.65 10*3/MM3 (ref 3.4–10.8)

## 2024-01-02 ENCOUNTER — HOSPITAL ENCOUNTER (OUTPATIENT)
Dept: PHYSICAL THERAPY | Facility: HOSPITAL | Age: 64
Setting detail: THERAPIES SERIES
Discharge: HOME | End: 2024-01-02
Payer: COMMERCIAL

## 2024-01-02 NOTE — CASE MANAGEMENT/SOCIAL WORK
Continued Stay Note  LALITO PrietoBristow     Patient Name: Surjit Stevenson  MRN: [id number]  Today's Date: 1/2/2024    Admit Date: (Not on file)    Plan: Home with friend to help and OP PT at [facility]   Discharge Plan       Row Name 01/02/24 1342       Plan    Plan Home with friend to help and OP PT at [facility]    Patient/Family in Agreement with Plan yes    Plan Comments CM spoke with patient today regarding pre admission discharge planing for his surgery with Dr. Dowling on 1/8/24. Patient states he lives alone in a single story house with a basement and 10 steps with handrail to gain entry. He states he normally has no issues entering the home or maneuvering inside. He also states his friend Claudio will be able to assist with needs at home and provide ride at discharge. We discussed his plan for physical therapy such as home health versus OP PT and he states he would like to do OP PT at [facility] and is agreeable for CM to arrange this service. We then discussed DME and he states the only equipment he has at home is crutches and will need a rolling walker at discharge and declines the need for a BSC at this time and is agreeable for CM to arrange this equipment through [employer]. Patient states he plans on returning home at discharge with his friend to help as needed and OP PT. He  had no other questions or concerns regarding discharge plans. ANNELIESE called and spoke with Felicia Cabrera in rehab and she has scheduled an appointment for 1/10/24 @ 8:00am. CM will follow.                   Discharge Codes    No documentation.                       Elizabeth Rojo RN

## 2024-01-04 ENCOUNTER — OFFICE VISIT (OUTPATIENT)
Dept: INTERNAL MEDICINE | Facility: CLINIC | Age: 64
End: 2024-01-04
Payer: COMMERCIAL

## 2024-01-04 VITALS
HEART RATE: 85 BPM | WEIGHT: 198 LBS | TEMPERATURE: 98.2 F | OXYGEN SATURATION: 99 % | DIASTOLIC BLOOD PRESSURE: 80 MMHG | SYSTOLIC BLOOD PRESSURE: 132 MMHG | HEIGHT: 69 IN | BODY MASS INDEX: 29.33 KG/M2

## 2024-01-04 DIAGNOSIS — D50.8 IRON DEFICIENCY ANEMIA SECONDARY TO INADEQUATE DIETARY IRON INTAKE: Primary | ICD-10-CM

## 2024-01-04 PROCEDURE — 99213 OFFICE O/P EST LOW 20 MIN: CPT | Performed by: INTERNAL MEDICINE

## 2024-01-04 NOTE — PROGRESS NOTES
"Surjit Stevenson is a 63 y.o. male who presents with a chief complaint of   Chief Complaint   Patient presents with    Pre-op Exam       HPI     Labs improved on re-check and he looks good today.        The following portions of the patient's history were reviewed and updated as appropriate: allergies, current medications, past family history, past medical history, past social history, past surgical history and problem list.      Current Outpatient Medications:     acetaminophen (TYLENOL) 500 MG tablet, Take 1 tablet by mouth Every 6 (Six) Hours As Needed for Mild Pain., Disp: , Rfl:     celecoxib (CeleBREX) 100 MG capsule, Take 1 capsule by mouth 2 (Two) Times a Day As Needed for Moderate Pain. (Patient not taking: Reported on 1/4/2024), Disp: 180 capsule, Rfl: 0            Physical Exam  /80 (BP Location: Left arm, Patient Position: Sitting, Cuff Size: Adult)   Pulse 85   Temp 98.2 °F (36.8 °C) (Infrared)   Ht 175.3 cm (69.02\")   Wt 89.8 kg (198 lb)   SpO2 99%   BMI 29.23 kg/m²     Physical Exam  Vitals reviewed.   Constitutional:       General: He is not in acute distress.     Appearance: Normal appearance.   HENT:      Head: Normocephalic and atraumatic.      Nose: Nose normal.      Mouth/Throat:      Mouth: Mucous membranes are moist.   Eyes:      Conjunctiva/sclera: Conjunctivae normal.   Pulmonary:      Effort: Pulmonary effort is normal.   Skin:     General: Skin is warm and dry.   Neurological:      General: No focal deficit present.      Mental Status: He is alert.   Psychiatric:         Mood and Affect: Mood normal.           Results for orders placed or performed in visit on 12/28/23   Comprehensive Metabolic Panel    Specimen: Blood   Result Value Ref Range    Glucose 99 65 - 99 mg/dL    BUN 15 8 - 23 mg/dL    Creatinine 0.99 0.76 - 1.27 mg/dL    EGFR Result 86.7 >60.0 mL/min/1.73    BUN/Creatinine Ratio 15.2 7.0 - 25.0    Sodium 138 136 - 145 mmol/L    Potassium 5.2 3.5 - 5.2 mmol/L    " Chloride 102 98 - 107 mmol/L    Total CO2 25.9 22.0 - 29.0 mmol/L    Calcium 9.2 8.6 - 10.5 mg/dL    Total Protein 7.3 6.0 - 8.5 g/dL    Albumin 4.0 3.5 - 5.2 g/dL    Globulin 3.3 gm/dL    A/G Ratio 1.2 g/dL    Total Bilirubin 0.3 0.0 - 1.2 mg/dL    Alkaline Phosphatase 131 (H) 39 - 117 U/L    AST (SGOT) 18 1 - 40 U/L    ALT (SGPT) 20 1 - 41 U/L   Ferritin    Specimen: Blood   Result Value Ref Range    Ferritin 227.00 30.00 - 400.00 ng/mL   Iron Profile    Specimen: Blood   Result Value Ref Range    TIBC 332 mcg/dL    UIBC 314 112 - 346 mcg/dL    Iron 18 (L) 59 - 158 mcg/dL    Iron Saturation 5 (L) 20 - 50 %   Magnesium    Specimen: Blood   Result Value Ref Range    Magnesium 2.1 1.6 - 2.4 mg/dL   Folate    Specimen: Blood   Result Value Ref Range    Folate 3.57 (L) 4.78 - 24.20 ng/mL   Vitamin B12    Specimen: Blood   Result Value Ref Range    Vitamin B-12 433 211 - 946 pg/mL   CBC & Differential    Specimen: Blood   Result Value Ref Range    WBC 6.65 3.40 - 10.80 10*3/mm3    RBC 4.98 4.14 - 5.80 10*6/mm3    Hemoglobin 12.6 (L) 13.0 - 17.7 g/dL    Hematocrit 39.2 37.5 - 51.0 %    MCV 78.7 (L) 79.0 - 97.0 fL    MCH 25.3 (L) 26.6 - 33.0 pg    MCHC 32.1 31.5 - 35.7 g/dL    RDW 13.9 12.3 - 15.4 %    Platelets 243 140 - 450 10*3/mm3    Neutrophil Rel % 81.4 (H) 42.7 - 76.0 %    Lymphocyte Rel % 8.3 (L) 19.6 - 45.3 %    Monocyte Rel % 7.8 5.0 - 12.0 %    Eosinophil Rel % 1.4 0.3 - 6.2 %    Basophil Rel % 0.8 0.0 - 1.5 %    Neutrophils Absolute 5.42 1.70 - 7.00 10*3/mm3    Lymphocytes Absolute 0.55 (L) 0.70 - 3.10 10*3/mm3    Monocytes Absolute 0.52 0.10 - 0.90 10*3/mm3    Eosinophils Absolute 0.09 0.00 - 0.40 10*3/mm3    Basophils Absolute 0.05 0.00 - 0.20 10*3/mm3    Immature Granulocyte Rel % 0.3 0.0 - 0.5 %    Immature Grans Absolute 0.02 0.00 - 0.05 10*3/mm3    nRBC 0.0 0.0 - 0.2 /100 WBC           Diagnoses and all orders for this visit:    1. Iron deficiency anemia secondary to inadequate dietary iron intake  (Primary)      Discussed labs and that I am happy with the improvement I saw.  Take MVI with folate and iron.  Continues to deny having anything to drink in the last 1 month.  Proceed to surgery from my standpoint as knee does appear to be causing severe symptoms and I think he is as low risk as he can be at this point.

## 2024-01-05 ENCOUNTER — ANESTHESIA EVENT (OUTPATIENT)
Dept: PERIOP | Facility: HOSPITAL | Age: 64
End: 2024-01-05
Payer: COMMERCIAL

## 2024-01-05 ENCOUNTER — OFFICE VISIT (OUTPATIENT)
Dept: ORTHOPEDIC SURGERY | Facility: CLINIC | Age: 64
End: 2024-01-05
Payer: COMMERCIAL

## 2024-01-05 VITALS — WEIGHT: 198 LBS | BODY MASS INDEX: 29.33 KG/M2 | HEIGHT: 69 IN

## 2024-01-05 DIAGNOSIS — M25.462 EFFUSION OF LEFT KNEE: ICD-10-CM

## 2024-01-05 DIAGNOSIS — M17.32 POST-TRAUMATIC OSTEOARTHRITIS OF LEFT KNEE: Primary | ICD-10-CM

## 2024-01-05 PROBLEM — D50.8 IRON DEFICIENCY ANEMIA SECONDARY TO INADEQUATE DIETARY IRON INTAKE: Status: ACTIVE | Noted: 2024-01-05

## 2024-01-05 PROCEDURE — 99024 POSTOP FOLLOW-UP VISIT: CPT | Performed by: INTERNAL MEDICINE

## 2024-01-05 NOTE — H&P
[facility]   HISTORY AND PHYSICAL    Patient Name:Surjit Stevenson  : 1960  MRN: [id number]  Primary Care Physician: Felicia Tomlinson MD  Date of admission: (Not on file)    Subjective   Subjective     Chief Complaint: left knee pain/OA    History of Present Illness   Surjit Stevenson is a 63 y.o. male with longstanding history of left knee issues.  The patient underwent a knee surgery when he was 20 for what appears to be an MCL and ACL tear.  He has subsequently developed severe posttraumatic osteoarthritis with gross ligamentous instability.  The patient has a  and states the knee pain swelling and instability has significantly limited his ability to do his job at work.  He has seen me multiple times and has undergone effusion aspirations and intra-articular corticosteroid injections.  The pain and swelling has now been refractory to these treatments and he was seen on 2023 and scheduled for left total knee arthroplasty.  He presents today for final evaluation in anticipation of that upcoming procedure.    Review of Systems   Musculoskeletal:  Positive for arthralgias, gait problem, joint swelling and myalgias.   All other systems reviewed and are negative.        Personal History     Past Medical History:   Diagnosis Date    Arthritis        Past Surgical History:   Procedure Laterality Date    HERNIA REPAIR      KNEE SURGERY Bilateral     1980s       Family History: His family history includes Hypertension in his mother; Kidney cancer in his father; Prostate cancer in his father.     Social History: He  reports that he quit smoking about 36 years ago. His smoking use included cigarettes. He has a 10.00 pack-year smoking history. He has been exposed to tobacco smoke. He has never used smokeless tobacco. He reports current alcohol use. He reports that he does not use drugs.    Home Medications:  acetaminophen and celecoxib    Allergies:  He is allergic to chocolate.    Objective     Objective     Vitals:    Temp:  [98.2 °F (36.8 °C)] 98.2 °F (36.8 °C)  Heart Rate:  [85] 85  BP: (132)/(80) 132/80    Physical Exam   Left Knee Exam      Tenderness   Left knee tenderness location: global moderate.     Range of Motion   Extension:  0   Flexion:  130      Tests   Varus: positive Valgus: positive  Lachman:  Anterior - positive    Posterior - positive  Drawer:  Anterior - positive     Posterior - positive     Other   Erythema: absent  Scars: present  Sensation: normal  Pulse: present  Swelling: severe  Effusion: effusion present    Result Review    Imagin views of the left knee  Indication: left knee pain  Findings: X-rays demonstrate no acute osseous abnormality.  There is no signs of fracture dislocation or subluxation.  There is joint space narrowing, subchondral sclerosis, cystic changes, and periarticular osteophytes most pronounced in all 3 joints.  There is severe joint degeneration and the large effusion noted.  There are staples consistent with prior MCL and ACL reconstruction.  Comparative studies: MRI    Assessment & Plan   Assessment / Plan     Brief Patient Summary:  Surjit Stevenson is a 63 y.o. male with severe left knee posttraumatic osteoarthritis    Active Hospital Problems:  There are no active hospital problems to display for this patient.    Plan:   Cc: f/u left knee pain, DJD    Patient presented to clinic today for preoperative history and physical visit in anticipation of upcoming scheduled left total knee arthroplasty.    I reviewed anatomy and a model of a total knee arthroplasty, as well as typical postoperative recovery of 6-12 months before maximal recovery, and possible need for rehabilitation stay after hospitalization. We also discussed risks, benefits, and alternatives of procedure with risks including but not limited to neurovascular damage, bleeding, infection, malalignment, chronic pian, failure of implants, osteolysis, loosening of implants, loss of motion,  weakness, stiffness, instability, DVT, pulmonary embolus, death, stroke, complex regional pain syndrome, myocardial infarction, and need for additional procedures. Patient understood all these and had all questions answered before consenting to the procedure. No guarantees were given in regards to results from the surgery.  Patient has been medically optimize by his primary care physician.    Plan for left total knee arthroplasty.    Implants: [employer] cemented Legion TKS with [employer]  Anticoagulation: Asprin  Antibiotics: Cefazolin and Vanc  Admission Type: 23 HR Obs  Post op ABX: Yes  TXA: Yes    All remaining questions answered today.     DVT prophylaxis:  No DVT prophylaxis order currently exists.    Iain Dowling MD

## 2024-01-08 ENCOUNTER — HOSPITAL ENCOUNTER (OUTPATIENT)
Facility: HOSPITAL | Age: 64
Discharge: HOME | End: 2024-01-09
Attending: INTERNAL MEDICINE | Admitting: INTERNAL MEDICINE
Payer: COMMERCIAL

## 2024-01-08 ENCOUNTER — ANESTHESIA (OUTPATIENT)
Dept: PERIOP | Facility: HOSPITAL | Age: 64
End: 2024-01-08
Payer: COMMERCIAL

## 2024-01-08 ENCOUNTER — APPOINTMENT (OUTPATIENT)
Dept: GENERAL RADIOLOGY | Facility: HOSPITAL | Age: 64
End: 2024-01-08
Payer: COMMERCIAL

## 2024-01-08 DIAGNOSIS — M17.32 POST-TRAUMATIC OSTEOARTHRITIS OF LEFT KNEE: ICD-10-CM

## 2024-01-08 DIAGNOSIS — M25.462 EFFUSION OF LEFT KNEE: ICD-10-CM

## 2024-01-08 PROCEDURE — 25010000002 ONDANSETRON PER 1 MG: Performed by: NURSE ANESTHETIST, CERTIFIED REGISTERED

## 2024-01-08 PROCEDURE — 25010000002 CEFAZOLIN PER 500 MG: Performed by: INTERNAL MEDICINE

## 2024-01-08 PROCEDURE — 73560 X-RAY EXAM OF KNEE 1 OR 2: CPT

## 2024-01-08 PROCEDURE — C1776 JOINT DEVICE (IMPLANTABLE): HCPCS | Performed by: INTERNAL MEDICINE

## 2024-01-08 PROCEDURE — 25010000002 KETOROLAC TROMETHAMINE PER 15 MG: Performed by: INTERNAL MEDICINE

## 2024-01-08 PROCEDURE — 25010000002 MIDAZOLAM PER 1MG: Performed by: NURSE ANESTHETIST, CERTIFIED REGISTERED

## 2024-01-08 PROCEDURE — G0378 HOSPITAL OBSERVATION PER HR: HCPCS

## 2024-01-08 PROCEDURE — 25810000003 LACTATED RINGERS PER 1000 ML: Performed by: INTERNAL MEDICINE

## 2024-01-08 PROCEDURE — 87070 CULTURE OTHR SPECIMN AEROBIC: CPT | Performed by: INTERNAL MEDICINE

## 2024-01-08 PROCEDURE — 25010000002 PROPOFOL 200 MG/20ML EMULSION

## 2024-01-08 PROCEDURE — 27447 TOTAL KNEE ARTHROPLASTY: CPT | Performed by: SPECIALIST/TECHNOLOGIST, OTHER

## 2024-01-08 PROCEDURE — C1713 ANCHOR/SCREW BN/BN,TIS/BN: HCPCS | Performed by: INTERNAL MEDICINE

## 2024-01-08 PROCEDURE — 25010000002 BUPIVACAINE 0.5 % SOLUTION: Performed by: NURSE ANESTHETIST, CERTIFIED REGISTERED

## 2024-01-08 PROCEDURE — 25010000002 KETOROLAC TROMETHAMINE PER 15 MG: Performed by: NURSE ANESTHETIST, CERTIFIED REGISTERED

## 2024-01-08 PROCEDURE — 25010000002 THIAMINE HCL 200 MG/2ML SOLUTION: Performed by: INTERNAL MEDICINE

## 2024-01-08 PROCEDURE — 87176 TISSUE HOMOGENIZATION CULTR: CPT | Performed by: INTERNAL MEDICINE

## 2024-01-08 PROCEDURE — 27447 TOTAL KNEE ARTHROPLASTY: CPT | Performed by: INTERNAL MEDICINE

## 2024-01-08 PROCEDURE — 25010000002 ROPIVACAINE PER 1 MG: Performed by: INTERNAL MEDICINE

## 2024-01-08 PROCEDURE — 25010000002 DEXAMETHASONE PER 1 MG: Performed by: NURSE ANESTHETIST, CERTIFIED REGISTERED

## 2024-01-08 PROCEDURE — 87015 SPECIMEN INFECT AGNT CONCNTJ: CPT | Performed by: INTERNAL MEDICINE

## 2024-01-08 PROCEDURE — 94761 N-INVAS EAR/PLS OXIMETRY MLT: CPT

## 2024-01-08 PROCEDURE — 25010000002 VANCOMYCIN 1.5 G RECONSTITUTED SOLUTION 1 EACH VIAL: Performed by: INTERNAL MEDICINE

## 2024-01-08 PROCEDURE — 87075 CULTR BACTERIA EXCEPT BLOOD: CPT | Performed by: INTERNAL MEDICINE

## 2024-01-08 PROCEDURE — 25810000003 SODIUM CHLORIDE 0.9 % SOLUTION 500 ML FLEX CONT: Performed by: INTERNAL MEDICINE

## 2024-01-08 PROCEDURE — 88305 TISSUE EXAM BY PATHOLOGIST: CPT | Performed by: INTERNAL MEDICINE

## 2024-01-08 PROCEDURE — 25010000002 PROPOFOL 10 MG/ML EMULSION

## 2024-01-08 PROCEDURE — 87205 SMEAR GRAM STAIN: CPT | Performed by: INTERNAL MEDICINE

## 2024-01-08 PROCEDURE — 25810000003 LACTATED RINGERS PER 1000 ML: Performed by: NURSE ANESTHETIST, CERTIFIED REGISTERED

## 2024-01-08 PROCEDURE — 25010000002 VANCOMYCIN 1 G RECONSTITUTED SOLUTION: Performed by: INTERNAL MEDICINE

## 2024-01-08 PROCEDURE — 25010000002 BUPIVACAINE (PF) 0.25 % SOLUTION: Performed by: NURSE ANESTHETIST, CERTIFIED REGISTERED

## 2024-01-08 PROCEDURE — 25010000002 EPINEPHRINE 1 MG/ML SOLUTION 1 ML VIAL: Performed by: INTERNAL MEDICINE

## 2024-01-08 DEVICE — DEV CONTRL TISS STRATAFIX SPIRAL PDO BIDIR 1 36X36CM: Type: IMPLANTABLE DEVICE | Site: KNEE | Status: FUNCTIONAL

## 2024-01-08 DEVICE — DEV WND/CLS CONTRL TISS STRATAFIX SYMM PDS PLS CTX 60CM VIL: Type: IMPLANTABLE DEVICE | Site: KNEE | Status: FUNCTIONAL

## 2024-01-08 DEVICE — DEV WND/CLS CONTRL TISS STRATAFIX MNCRYL CT-1 2/0 70CM: Type: IMPLANTABLE DEVICE | Site: KNEE | Status: FUNCTIONAL

## 2024-01-08 DEVICE — GENESIS II NON-POROUS TIBIAL                                    BASEPLATE SIZE 6 LT
Type: IMPLANTABLE DEVICE | Site: KNEE | Status: FUNCTIONAL
Brand: GENESIS II

## 2024-01-08 DEVICE — CMT BONE PALACOS/MV MD/VISC 40GM: Type: IMPLANTABLE DEVICE | Site: KNEE | Status: FUNCTIONAL

## 2024-01-08 DEVICE — DEV CONTRL TISS STRATAFIX SPIRAL MNCRYL UD 3/0 PLS 45CM: Type: IMPLANTABLE DEVICE | Site: KNEE | Status: FUNCTIONAL

## 2024-01-08 DEVICE — GENESIS II CONSTRAINED ART INSERT                                    SIZE 5-6 13MM
Type: IMPLANTABLE DEVICE | Site: KNEE | Status: FUNCTIONAL
Brand: GENESIS II

## 2024-01-08 DEVICE — IMPLANTABLE DEVICE: Type: IMPLANTABLE DEVICE | Site: KNEE | Status: FUNCTIONAL

## 2024-01-08 DEVICE — LEGION PS OXINIUM FEMORAL SZ 7 LEFT
Type: IMPLANTABLE DEVICE | Site: KNEE | Status: FUNCTIONAL
Brand: LEGION

## 2024-01-08 RX ORDER — OXYCODONE HYDROCHLORIDE 5 MG/1
5 TABLET ORAL EVERY 4 HOURS PRN
Status: DISCONTINUED | OUTPATIENT
Start: 2024-01-08 | End: 2024-01-09 | Stop reason: HOSPADM

## 2024-01-08 RX ORDER — DEXMEDETOMIDINE HYDROCHLORIDE 100 UG/ML
INJECTION, SOLUTION INTRAVENOUS
Status: COMPLETED | OUTPATIENT
Start: 2024-01-08 | End: 2024-01-08

## 2024-01-08 RX ORDER — SODIUM CHLORIDE, SODIUM LACTATE, POTASSIUM CHLORIDE, CALCIUM CHLORIDE 600; 310; 30; 20 MG/100ML; MG/100ML; MG/100ML; MG/100ML
9 INJECTION, SOLUTION INTRAVENOUS CONTINUOUS PRN
Status: DISCONTINUED | OUTPATIENT
Start: 2024-01-08 | End: 2024-01-09 | Stop reason: HOSPADM

## 2024-01-08 RX ORDER — PREGABALIN 75 MG/1
150 CAPSULE ORAL ONCE
Status: COMPLETED | OUTPATIENT
Start: 2024-01-08 | End: 2024-01-08

## 2024-01-08 RX ORDER — MELOXICAM 7.5 MG/1
15 TABLET ORAL ONCE
Status: COMPLETED | OUTPATIENT
Start: 2024-01-08 | End: 2024-01-08

## 2024-01-08 RX ORDER — BUPIVACAINE HYDROCHLORIDE 2.5 MG/ML
INJECTION, SOLUTION EPIDURAL; INFILTRATION; INTRACAUDAL
Status: COMPLETED | OUTPATIENT
Start: 2024-01-08 | End: 2024-01-08

## 2024-01-08 RX ORDER — FAMOTIDINE 10 MG/ML
20 INJECTION, SOLUTION INTRAVENOUS
Status: COMPLETED | OUTPATIENT
Start: 2024-01-08 | End: 2024-01-08

## 2024-01-08 RX ORDER — THIAMINE HYDROCHLORIDE 100 MG/ML
200 INJECTION, SOLUTION INTRAMUSCULAR; INTRAVENOUS EVERY 8 HOURS SCHEDULED
Status: DISCONTINUED | OUTPATIENT
Start: 2024-01-08 | End: 2024-01-09 | Stop reason: HOSPADM

## 2024-01-08 RX ORDER — LORAZEPAM 2 MG/ML
2 INJECTION INTRAMUSCULAR
Status: DISCONTINUED | OUTPATIENT
Start: 2024-01-08 | End: 2024-01-09 | Stop reason: HOSPADM

## 2024-01-08 RX ORDER — LORAZEPAM 1 MG/1
1 TABLET ORAL EVERY 6 HOURS
Status: DISCONTINUED | OUTPATIENT
Start: 2024-01-09 | End: 2024-01-09 | Stop reason: HOSPADM

## 2024-01-08 RX ORDER — FAMOTIDINE 20 MG/1
40 TABLET, FILM COATED ORAL DAILY
Status: DISCONTINUED | OUTPATIENT
Start: 2024-01-08 | End: 2024-01-09 | Stop reason: HOSPADM

## 2024-01-08 RX ORDER — BUPIVACAINE HYDROCHLORIDE 5 MG/ML
INJECTION, SOLUTION PERINEURAL
Status: COMPLETED | OUTPATIENT
Start: 2024-01-08 | End: 2024-01-08

## 2024-01-08 RX ORDER — TRANEXAMIC ACID 10 MG/ML
1000 INJECTION, SOLUTION INTRAVENOUS ONCE
Status: COMPLETED | OUTPATIENT
Start: 2024-01-08 | End: 2024-01-08

## 2024-01-08 RX ORDER — PROPOFOL 10 MG/ML
INJECTION, EMULSION INTRAVENOUS AS NEEDED
Status: DISCONTINUED | OUTPATIENT
Start: 2024-01-08 | End: 2024-01-08 | Stop reason: SURG

## 2024-01-08 RX ORDER — CHLORHEXIDINE GLUCONATE 500 MG/1
CLOTH TOPICAL ONCE
Status: DISCONTINUED | OUTPATIENT
Start: 2024-01-08 | End: 2024-01-09 | Stop reason: HOSPADM

## 2024-01-08 RX ORDER — ACETAMINOPHEN 650 MG
TABLET, EXTENDED RELEASE ORAL AS NEEDED
Status: DISCONTINUED | OUTPATIENT
Start: 2024-01-08 | End: 2024-01-08 | Stop reason: HOSPADM

## 2024-01-08 RX ORDER — MAGNESIUM HYDROXIDE 1200 MG/15ML
LIQUID ORAL AS NEEDED
Status: DISCONTINUED | OUTPATIENT
Start: 2024-01-08 | End: 2024-01-08 | Stop reason: HOSPADM

## 2024-01-08 RX ORDER — ONDANSETRON 2 MG/ML
4 INJECTION INTRAMUSCULAR; INTRAVENOUS EVERY 6 HOURS PRN
Status: DISCONTINUED | OUTPATIENT
Start: 2024-01-08 | End: 2024-01-09 | Stop reason: HOSPADM

## 2024-01-08 RX ORDER — ONDANSETRON 2 MG/ML
4 INJECTION INTRAMUSCULAR; INTRAVENOUS ONCE AS NEEDED
Status: DISCONTINUED | OUTPATIENT
Start: 2024-01-08 | End: 2024-01-08 | Stop reason: HOSPADM

## 2024-01-08 RX ORDER — MELOXICAM 7.5 MG/1
15 TABLET ORAL DAILY
Status: DISCONTINUED | OUTPATIENT
Start: 2024-01-08 | End: 2024-01-09 | Stop reason: HOSPADM

## 2024-01-08 RX ORDER — FOLIC ACID 1 MG/1
1 TABLET ORAL DAILY
Status: DISCONTINUED | OUTPATIENT
Start: 2024-01-08 | End: 2024-01-09 | Stop reason: HOSPADM

## 2024-01-08 RX ORDER — ONDANSETRON 4 MG/1
4 TABLET, ORALLY DISINTEGRATING ORAL EVERY 6 HOURS PRN
Status: DISCONTINUED | OUTPATIENT
Start: 2024-01-08 | End: 2024-01-09 | Stop reason: HOSPADM

## 2024-01-08 RX ORDER — KETOROLAC TROMETHAMINE 30 MG/ML
INJECTION, SOLUTION INTRAMUSCULAR; INTRAVENOUS AS NEEDED
Status: DISCONTINUED | OUTPATIENT
Start: 2024-01-08 | End: 2024-01-08 | Stop reason: SURG

## 2024-01-08 RX ORDER — LORAZEPAM 1 MG/1
1 TABLET ORAL
Status: DISCONTINUED | OUTPATIENT
Start: 2024-01-08 | End: 2024-01-09 | Stop reason: HOSPADM

## 2024-01-08 RX ORDER — OXYCODONE HYDROCHLORIDE 5 MG/1
10 TABLET ORAL EVERY 4 HOURS PRN
Status: DISCONTINUED | OUTPATIENT
Start: 2024-01-08 | End: 2024-01-09 | Stop reason: HOSPADM

## 2024-01-08 RX ORDER — VANCOMYCIN HYDROCHLORIDE 1 G/20ML
INJECTION, POWDER, LYOPHILIZED, FOR SOLUTION INTRAVENOUS AS NEEDED
Status: DISCONTINUED | OUTPATIENT
Start: 2024-01-08 | End: 2024-01-08 | Stop reason: HOSPADM

## 2024-01-08 RX ORDER — LIDOCAINE HYDROCHLORIDE 20 MG/ML
INJECTION, SOLUTION INFILTRATION; PERINEURAL AS NEEDED
Status: DISCONTINUED | OUTPATIENT
Start: 2024-01-08 | End: 2024-01-08 | Stop reason: SURG

## 2024-01-08 RX ORDER — ASPIRIN 81 MG/1
81 TABLET ORAL EVERY 12 HOURS SCHEDULED
Status: DISCONTINUED | OUTPATIENT
Start: 2024-01-09 | End: 2024-01-09 | Stop reason: HOSPADM

## 2024-01-08 RX ORDER — OXYCODONE HYDROCHLORIDE AND ACETAMINOPHEN 5; 325 MG/1; MG/1
1 TABLET ORAL ONCE AS NEEDED
Status: DISCONTINUED | OUTPATIENT
Start: 2024-01-08 | End: 2024-01-08 | Stop reason: HOSPADM

## 2024-01-08 RX ORDER — MORPHINE SULFATE 2 MG/ML
2 INJECTION, SOLUTION INTRAMUSCULAR; INTRAVENOUS
Status: DISCONTINUED | OUTPATIENT
Start: 2024-01-08 | End: 2024-01-09 | Stop reason: HOSPADM

## 2024-01-08 RX ORDER — ONDANSETRON 2 MG/ML
4 INJECTION INTRAMUSCULAR; INTRAVENOUS ONCE AS NEEDED
Status: COMPLETED | OUTPATIENT
Start: 2024-01-08 | End: 2024-01-08

## 2024-01-08 RX ORDER — SODIUM CHLORIDE 0.9 % (FLUSH) 0.9 %
10 SYRINGE (ML) INJECTION AS NEEDED
Status: DISCONTINUED | OUTPATIENT
Start: 2024-01-08 | End: 2024-01-08 | Stop reason: HOSPADM

## 2024-01-08 RX ORDER — SODIUM CHLORIDE, SODIUM LACTATE, POTASSIUM CHLORIDE, CALCIUM CHLORIDE 600; 310; 30; 20 MG/100ML; MG/100ML; MG/100ML; MG/100ML
100 INJECTION, SOLUTION INTRAVENOUS CONTINUOUS
Status: DISCONTINUED | OUTPATIENT
Start: 2024-01-08 | End: 2024-01-09 | Stop reason: HOSPADM

## 2024-01-08 RX ORDER — SODIUM CHLORIDE 9 MG/ML
40 INJECTION, SOLUTION INTRAVENOUS AS NEEDED
Status: DISCONTINUED | OUTPATIENT
Start: 2024-01-08 | End: 2024-01-08 | Stop reason: HOSPADM

## 2024-01-08 RX ORDER — MIDAZOLAM HYDROCHLORIDE 2 MG/2ML
1 INJECTION, SOLUTION INTRAMUSCULAR; INTRAVENOUS
Status: DISCONTINUED | OUTPATIENT
Start: 2024-01-08 | End: 2024-01-08 | Stop reason: HOSPADM

## 2024-01-08 RX ORDER — SODIUM CHLORIDE 0.9 % (FLUSH) 0.9 %
10 SYRINGE (ML) INJECTION EVERY 12 HOURS SCHEDULED
Status: DISCONTINUED | OUTPATIENT
Start: 2024-01-08 | End: 2024-01-08 | Stop reason: HOSPADM

## 2024-01-08 RX ORDER — DEXAMETHASONE SODIUM PHOSPHATE 4 MG/ML
4 INJECTION, SOLUTION INTRA-ARTICULAR; INTRALESIONAL; INTRAMUSCULAR; INTRAVENOUS; SOFT TISSUE ONCE AS NEEDED
Status: COMPLETED | OUTPATIENT
Start: 2024-01-08 | End: 2024-01-08

## 2024-01-08 RX ORDER — LORAZEPAM 1 MG/1
2 TABLET ORAL
Status: DISCONTINUED | OUTPATIENT
Start: 2024-01-08 | End: 2024-01-09 | Stop reason: HOSPADM

## 2024-01-08 RX ORDER — NALOXONE HCL 0.4 MG/ML
0.4 VIAL (ML) INJECTION
Status: DISCONTINUED | OUTPATIENT
Start: 2024-01-08 | End: 2024-01-09 | Stop reason: HOSPADM

## 2024-01-08 RX ORDER — LORAZEPAM 1 MG/1
2 TABLET ORAL EVERY 6 HOURS
Status: DISCONTINUED | OUTPATIENT
Start: 2024-01-08 | End: 2024-01-09 | Stop reason: HOSPADM

## 2024-01-08 RX ORDER — ACETAMINOPHEN 500 MG
1000 TABLET ORAL 3 TIMES DAILY
Status: DISCONTINUED | OUTPATIENT
Start: 2024-01-08 | End: 2024-01-09 | Stop reason: HOSPADM

## 2024-01-08 RX ORDER — LORAZEPAM 2 MG/ML
1 INJECTION INTRAMUSCULAR
Status: DISCONTINUED | OUTPATIENT
Start: 2024-01-08 | End: 2024-01-09 | Stop reason: HOSPADM

## 2024-01-08 RX ADMIN — LORAZEPAM 2 MG: 1 TABLET ORAL at 15:05

## 2024-01-08 RX ADMIN — PROPOFOL INJECTABLE EMULSION 50 MG: 10 INJECTION, EMULSION INTRAVENOUS at 10:23

## 2024-01-08 RX ADMIN — BUPIVACAINE HYDROCHLORIDE 1.8 ML: 5 INJECTION, SOLUTION PERINEURAL at 10:09

## 2024-01-08 RX ADMIN — MELOXICAM 15 MG: 7.5 TABLET ORAL at 15:05

## 2024-01-08 RX ADMIN — BUPIVACAINE HYDROCHLORIDE 20 ML: 2.5 INJECTION, SOLUTION EPIDURAL; INFILTRATION; INTRACAUDAL at 09:07

## 2024-01-08 RX ADMIN — LIDOCAINE HYDROCHLORIDE 5 ML: 20 INJECTION, SOLUTION INFILTRATION; PERINEURAL at 10:23

## 2024-01-08 RX ADMIN — KETOROLAC TROMETHAMINE 30 MG: 30 INJECTION, SOLUTION INTRAMUSCULAR; INTRAVENOUS at 12:16

## 2024-01-08 RX ADMIN — FAMOTIDINE 20 MG: 10 INJECTION, SOLUTION INTRAVENOUS at 08:23

## 2024-01-08 RX ADMIN — SODIUM CHLORIDE, POTASSIUM CHLORIDE, SODIUM LACTATE AND CALCIUM CHLORIDE 9 ML/HR: 600; 310; 30; 20 INJECTION, SOLUTION INTRAVENOUS at 08:22

## 2024-01-08 RX ADMIN — CEFAZOLIN 2 G: 2 INJECTION, POWDER, FOR SOLUTION INTRAVENOUS at 10:20

## 2024-01-08 RX ADMIN — BUPIVACAINE HYDROCHLORIDE 15 ML: 2.5 INJECTION, SOLUTION EPIDURAL; INFILTRATION; INTRACAUDAL; PERINEURAL at 09:00

## 2024-01-08 RX ADMIN — TRANEXAMIC ACID 1000 MG: 10 INJECTION, SOLUTION INTRAVENOUS at 10:32

## 2024-01-08 RX ADMIN — DEXAMETHASONE SODIUM PHOSPHATE 4 MG: 4 INJECTION, SOLUTION INTRAMUSCULAR; INTRAVENOUS at 08:23

## 2024-01-08 RX ADMIN — DEXMEDETOMIDINE 30 MCG: 100 INJECTION, SOLUTION, CONCENTRATE INTRAVENOUS at 09:00

## 2024-01-08 RX ADMIN — ONDANSETRON 4 MG: 2 INJECTION INTRAMUSCULAR; INTRAVENOUS at 08:23

## 2024-01-08 RX ADMIN — MELOXICAM 15 MG: 7.5 TABLET ORAL at 08:23

## 2024-01-08 RX ADMIN — CEFAZOLIN 2000 MG: 2 INJECTION, POWDER, FOR SOLUTION INTRAVENOUS at 18:00

## 2024-01-08 RX ADMIN — PROPOFOL INJECTABLE EMULSION 100 MCG/KG/MIN: 10 INJECTION, EMULSION INTRAVENOUS at 10:23

## 2024-01-08 RX ADMIN — ACETAMINOPHEN 1000 MG: 500 TABLET ORAL at 20:49

## 2024-01-08 RX ADMIN — MIDAZOLAM HYDROCHLORIDE 1 MG: 1 INJECTION, SOLUTION INTRAMUSCULAR; INTRAVENOUS at 08:57

## 2024-01-08 RX ADMIN — THIAMINE HYDROCHLORIDE 200 MG: 100 INJECTION, SOLUTION INTRAMUSCULAR; INTRAVENOUS at 20:49

## 2024-01-08 RX ADMIN — DEXMEDETOMIDINE HYDROCHLORIDE 40 MCG: 100 INJECTION, SOLUTION INTRAVENOUS at 09:07

## 2024-01-08 RX ADMIN — TRANEXAMIC ACID 1000 MG: 10 INJECTION, SOLUTION INTRAVENOUS at 11:48

## 2024-01-08 RX ADMIN — FOLIC ACID 1 MG: 1 TABLET ORAL at 15:05

## 2024-01-08 RX ADMIN — THIAMINE HYDROCHLORIDE 200 MG: 100 INJECTION, SOLUTION INTRAMUSCULAR; INTRAVENOUS at 15:05

## 2024-01-08 RX ADMIN — PREGABALIN 150 MG: 75 CAPSULE ORAL at 08:23

## 2024-01-08 RX ADMIN — VANCOMYCIN HYDROCHLORIDE 1500 MG: 1.5 INJECTION, POWDER, LYOPHILIZED, FOR SOLUTION INTRAVENOUS at 08:23

## 2024-01-08 RX ADMIN — ACETAMINOPHEN 1000 MG: 500 TABLET ORAL at 16:59

## 2024-01-08 RX ADMIN — FAMOTIDINE 40 MG: 20 TABLET ORAL at 15:05

## 2024-01-08 NOTE — PLAN OF CARE
Goal Outcome Evaluation:  Plan of Care Reviewed With: patient        Progress: improving  Outcome Evaluation: To MS from PACU. Sidney to room, call light and plan of care. CIWA scoring. Patient c/o numbness upon arriving to floor, but patient is beginning to get feeling back and is able to move BLE on command. ICE pack in place to L knee. Pt educated about and encouraged ambulation. IV saline locked. Pt tolerating diet w/o complaints. All care explained. All questions answered.

## 2024-01-08 NOTE — SIGNIFICANT NOTE
01/08/24 1446   OTHER   Discipline occupational therapist   Rehab Time/Intention   Session Not Performed other (see comments)  (pt with numbness and decreased motor control of bilateral lower extremities this afternoon s/p TKA. Will follow up on 1-9-24 to complete OT evaluation.)

## 2024-01-08 NOTE — ANESTHESIA PREPROCEDURE EVALUATION
Anesthesia Evaluation     Patient summary reviewed and Nursing notes reviewed   no history of anesthetic complications:   NPO Solid Status: > 8 hours  NPO Liquid Status: > 2 hours           Airway   Mallampati: II  TM distance: >3 FB  Neck ROM: full  No difficulty expected  Dental      Pulmonary     breath sounds clear to auscultation  Cardiovascular   Exercise tolerance: good (4-7 METS)    ECG reviewed  Rhythm: regular  Rate: normal      ROS comment: ECG 12 Lead     Date/Time: 12/28/2023 11:03 AM  Performed by: Priyanka Schaeffer APRN     Authorized by: Priyanka Schaeffer APRN  Comparison: not compared with previous ECG   Previous ECG: no previous ECG available  Rhythm: sinus rhythm  Ectopy comments: PVCs  BPM: 65  Conduction: conduction normal  QRS axis: normal     Clinical impression: non-specific ECG      Neuro/Psych  (+) weakness (with arthritis), psychiatric history Depression  GI/Hepatic/Renal/Endo - negative ROS     Musculoskeletal     Abdominal    Substance History   (+) alcohol use (heavy etoh use for last 5yrs btw 2-12 beers per day, pt states has stopped drinking currently for 1 month)     OB/GYN          Other   arthritis,                   Anesthesia Plan    ASA 3     MAC, regional and spinal     intravenous induction     Anesthetic plan, risks, benefits, and alternatives have been provided, discussed and informed consent has been obtained with: patient.    Use of blood products discussed with patient  Consented to blood products.      CODE STATUS:

## 2024-01-08 NOTE — SIGNIFICANT NOTE
01/08/24 1440   OTHER   Discipline physical therapist   Rehab Time/Intention   Session Not Performed other (see comments)  (pt with significant numbness and decreased motor control of bilateral lower extremities.)

## 2024-01-08 NOTE — OP NOTE
OPERATIVE REPORT    Date of Surgery:   01/08/24    Attending Surgeon:   Iain Dowling MD, MSE    ASSISTANTS:    Assistant: (Billy Sandra CSA) was responsible for performing the following activities: Retraction, Suction, Closing, and Placing Dressing and their skilled assistance was necessary for the success of this case.     PREOPERATIVE DIAGNOSIS:   Left knee osteoarthritis    POSTOPERATIVE DIAGNOSIS:   Same as above     PROCEDURE:   1. Left primary total knee arthroplasty, with robotic assistance (CORI)    Surgical Approach:  Medial parapatellar      IMPLANTS:   : [employer]  Brand: Legion TKS  Femoral component size: 7 PS  Tibial component size: 6   Patellar Button: none  Bearing type: Constrained  Insert Thickness: 13 mm    SURGICAL DETAILS:  Preoperative Passive Range of Motion: -14 to 136 degrees  Postoperative Passive Range of Motion: =4 to 131 degrees  Pre Op 4 degrees Varus,  Post Op 0 degrees Varus   Incision/arthrotomy type: Medial parapatellar  Posterior Cruciate Ligament: Sacrificed  Lateral release: No  Estimated blood loss: 100 cc  Anesthesia type: General and Regional  Tourniquet: Yes: 250 mmHG  69 Minutes    INDICATIONS FOR PROCEDURE:  This patient presents today with end stage degenerative joint disease of the knee. The patient has failed non-operative treatment and presents for total knee replacement. Risks, complications, and benefits of the procedure have been discussed and all questions have been answered preoperatively.    PROCEDURE:  The patient was met in the preoperative holding area, evaluated, consent was obtained, and the Left knee was marked. The patient was brought to the operating room and placed on the operating room table in the supine position. After anesthesia was established, the patient was positioned supine. Bony prominences were padded. The patient was given prophylactic antibiotics within one hour of skin incision. The involved lower extremity was prepped  and draped in usual sterile fashion.  Surgical timeout was taken to confirm the operative side and planned procedure.    A straight incision was used medial to the midline carried through the subcutaneous tissue to the underlying extensor mechanism. A medial parapatellar approach was utilized.  A small medial peel was then performed using Bovie electrocautery and the fat pad was sharply excised. The patella was everted, a lateral facetectomy was performed, marginal osteophytes trimmed,  and the synovial margin was cauterized, clearing excess synovium.  The synovium at the superolateral aspect of the junction of the trochlea and the anterior cortex the femur was removed with Bovie electrocautery and the knee was flexed.  The tourniquet was deflated at this point time.  The ACL was then removed and all overhanging tibial and femoral osteophytes were removed with a rongeur.  The tibial and femoral reference arrays were assembled first. 2 drill tip threaded pins were placed medial to the tibial tubercle within the surgical field. The pins were inserted under power using the drill guide included in the CORI set for their relative positions. Similarly, the 2 femoral pins were placed medially in the femur just proxmial to the medial epicondyle. The sensor arrays were assembled to the pins    The hip center was registered. The medial and lateral malleoli were registered. The femur and tibia were registered.    The knee motion and balance was assessed with standard poses. Pre-operative cut planning was adjusted to provide knee balance throughout the range of motion    We then turned out attention to the distal femur and using the CORI bur the distal femur resection was performed.  2 bur holes were then placed and the distal femoral punch was introduced for the  holes for the 4 in 1 femoral cutting block.     We then turned to the tibia and using the probe with a flat disc attached to it the tibial cutting block was  floated into place using robotic navication. It was held in place with 2 a to p pins and one cross pin.  Depth resection, varus/valgus, and tibial slope were again confirmed. We then performed our tibial resection with a Z retractor medially, bent homman laterally, and PCL retractor posteriorly.  The tibia resection was then removed.    Attention was then turned back to the femur and the appropriately sized 4 in 1 femoral cutting block was malleted into place and secured with 2 convergent threaded pins.     A laminar  was placed and the medial and lateral menisci were excised. Posterior femoral osteophytes were removed with an osteotome. The bovie was used to cauterize the posterior knee capsule and meniscal remnants.     The tibial surface was then sized using the trial baseplate and secured with 2 pins.  Careful attention was taken to ensure it was aligned with the medial third of the tibial tubercle. The trial femur was then impacted into place and the appropriate sized trail poly was inserted.  Knee motion and balance were checked manually and with the robotic assistance. The patella was not resurfaced. The patella tracked well. The femoral lug drills were performed. The trial femoral components and poly were removed. The tracker pins and arrays were removed.    Attention was then turned to the tibial prep.  The keel drill and punch introduced.     The trial tibial implant was removed. All bone was then prepared with lavage and drying for preparation prior to final component placement. The final tibial component was cemented into position and excess cement was removed. The final femoral component was cemented into position and excess cement was removed.   The trial poly was placed. After cement curing, motion and stability was again tested. The final tibial insert was exchanged to the final tibial insert which was impacted into position.  Surgical site was irrigated with dilute Betadine solution and then  normal saline with pulsatile lavage.  The medial and lateral capsular flaps as well as tibial and femoral periosteum was injected with a mixture consisting of 30 mL of 0.5% ropivacaine, 30 mg of ketorolac, and epinephrine 0.2 mg solution diluted in 30 mL of normal saline.    The arthrotomy was closed and imbricated with #5 ethibond and then with #1 PDS stratafix. The subcutaneous tissue was closed with 2-0 monocryl. The skin was closed with running 3-0 monocryl stratafix and dermabond a sterile BREEZY dressing was placed.      The patient tolerated the procedure well and was taken to the recovery room in stable condition.  Following completion of the surgery all sponge instrument needle counts were correct x2.    Due to the patient's prior surgeries, a much more time consuming and complex procedure was required.  Due to the patient's scar tissue and soft tissue envelope, it was much more difficult to assess the patient's anatomy and bone resections.  As previously explained to the patient, prior surgery increases the risks of morbidity and mortality during and after the procedure - including but not limited to infection, deep venous thrombosis, pulmonary thrombosis, neurovascular injury, premature failure of implants, and death.        POSTOPERATIVE PLAN:   1. Weightbearing as tolerated on operative extremity  2. DVT prophylaxis    WOUND CLOSURE:  #1 PDS Stratfix  2-0 monocryl subcuticular   3-0 monocryl stratafix skin  dermabond  BREEZY dressing  Ace Wrap    SPONGE/INSTRUMENT/NEEDLE COUNTS:   Correct x 2    CONDITION ON DISCHARGE:   Stable    COMPLICATIONS:   none  Iain Dowling MD, MSE

## 2024-01-08 NOTE — CONSULTS
[facility]OSPITAL[facility] CONSULT NOTE    Lenore Roberson APRN    CONSULT REASON: Medical management, ethanol use history     HISTORY OF PRESENT ILLNESS:    Patient is a 63-year-old male with past medical history significant for arthritis, previous alcohol use.  Patient reports he has discontinued alcohol use about a month ago but previously was drinking 17 beers daily.  He reports to [facility] under the care of Dr. Dowling with orthopedic surgery for left knee osteoarthritis.  Patient has successfully undergone left primary total knee arthroplasty with robotic assistance.  At time of my interview with patient he has been returned to [facility] in stable condition and has no acute complaints.     Past Medical History:   Diagnosis Date    Arthritis      Past Surgical History:   Procedure Laterality Date    HERNIA REPAIR      KNEE SURGERY Bilateral     1980s     Family History   Problem Relation Age of Onset    Hypertension Mother     Prostate cancer Father     Kidney cancer Father     Malig Hyperthermia Neg Hx      Social History     Tobacco Use    Smoking status: Former     Packs/day: 1.00     Years: 10.00     Additional pack years: 0.00     Total pack years: 10.00     Types: Cigarettes     Quit date:      Years since quittin.0     Passive exposure: Past    Smokeless tobacco: Never    Tobacco comments:     quit 30 yrs ago; was 1 ppd for 11 yrs   Vaping Use    Vaping Use: Never used   Substance Use Topics    Alcohol use: Yes     Comment: not currently, previously abused alcohol    Drug use: No     Medications Prior to Admission   Medication Sig Dispense Refill Last Dose    acetaminophen (TYLENOL) 500 MG tablet Take 1 tablet by mouth Every 6 (Six) Hours As Needed for Mild Pain.   2024    celecoxib (CeleBREX) 100 MG capsule Take 1 capsule by mouth 2 (Two) Times a Day As Needed for Moderate Pain. (Patient not taking: Reported on 2024) 180 capsule 0 2024  "    Allergies:  Chocolate    Immunization History   Administered Date(s) Administered    COVID-19 (MODERNA) 1st,2nd,3rd Dose Monovalent 04/15/2021, 04/15/2021, 05/13/2021, 05/13/2021    Tdap 01/01/2015       REVIEW OF SYSTEMS:  Please see the above history of present illness for pertinent positives and negatives.  The remainder of the patient's systems have been reviewed and are negative.     Vital Signs  Temp:  [97.7 °F (36.5 °C)-98.3 °F (36.8 °C)] 97.7 °F (36.5 °C)  Heart Rate:  [] 66  Resp:  [14-18] 18  BP: ()/(59-88) 129/75  Flowsheet Rows      Flowsheet Row First Filed Value   Admission Height 175.3 cm (69\") Documented at 01/08/2024 0810   Admission Weight 89.1 kg (196 lb 6.4 oz) Documented at 01/08/2024 0810               Physical Exam:  General: Patient is awake and alert. Well developed and well nourished. No acute distress noted.   HENT: Head is atraumatic, normocephalic. Hearing is grossly intact. Nose is without obvious congestion and appears patent. Neck is supple and trachea is midline.   Eyes: Vision is grossly intact. Pupils appear equal and round.   Cardiovascular: Heart has regular rate and rhythm with no murmurs, rubs or gallops noted.   Respiratory: Lungs are clear to ausculation without wheezes, rhonchi or rales.   Abdominal/GI: Soft, nontender, bowel sounds present. No rebound or guarding present.   Extremities: No peripheral edema noted.   Musculoskeletal: Spontaneous movement of bilateral upper and lower extremities against gravity noted. No signs of injury or deformity noted.   Skin: Warm and dry.   Psych: Mood and affect are appropriate. Cooperative with exam.   Neuro: No facial asymmetry noted. No focal deficits noted, hearing and vision are grossly intact.       Results Review:    I reviewed the patient's new clinical results.  Lab Results (most recent)       Procedure Component Value Units Date/Time    Anaerobic Culture - Synovium, Knee, Left [[id number]] Collected: 01/08/24 " 1105    Specimen: Synovium from Knee, Left Updated: 01/08/24 1238    Tissue Pathology Exam [[id number]] Collected: 01/08/24 1047    Specimen: Synovium from Knee, Left; Synovium from Knee, Left Updated: 01/08/24 1238            Imaging Results (Most Recent)       Procedure Component Value Units Date/Time    XR Knee 1 or 2 View Left [[id number]] Collected: 01/08/24 1329     Updated: 01/08/24 1333    Narrative:      POSTOP LEFT KNEE SERIES, 1/8/2024:     HISTORY:  Postop knee arthroplasty surgery.     TECHNIQUE:  AP and crosstable lateral radiographs of the left knee were obtained  portably following surgery.     FINDINGS:  Total knee arthroplasty components are well-positioned postoperatively.  No visible fracture or dislocation. Pre-existing stable hardware in the  distal femur and chronic ossification along the course of the upper MCL.       Impression:      Satisfactory postoperative appearance following left total knee  arthroplasty.     This report was finalized on 1/8/2024 1:31 PM by Dr. Elliot Cantor MD.       US Sonosite Portable [[id number]] Resulted: 01/08/24 0739     Updated: 01/08/24 0739    Narrative:      This procedure was auto-finalized with no dictation required.        None     ECG/EMG Results (most recent)       None          None     Assessment & Plan     Arthritis-holding home regimen, pain control as per ortho    Ethanol abuse history-patient reports abstinence for past month, unsure of accuracy of this. Out of abundance of caution will initiate CIWA protocol with ativan. We will follow along to ensure withdrawal symptoms are controlled.         I discussed the patient's findings and my recommendations with patient.     Vicente Leigh DO  12/21/2023   14:03 EST     At [facility], we believe that sharing information builds trust and better relationships. You are receiving this note because you recently visited [facility]. It is possible you will see health information before a  "provider has talked with you about it. This kind of information can be easy to misunderstand. To help you fully understand what it means for your health, we urge you to discuss this note with your provider.     \"Dictated utilizing Dragon dictation\"      "

## 2024-01-08 NOTE — ANESTHESIA PROCEDURE NOTES
Peripheral Block    Pre-sedation assessment completed: 1/8/2024 8:57 AM    Patient reassessed immediately prior to procedure    Patient location during procedure: pre-op  Start time: 1/8/2024 8:58 AM  Stop time: 1/8/2024 9:00 AM  Reason for block: at surgeon's request and post-op pain management  Performed by  CRNA/CAA: Akua Cruz, NOHEMYSRNA: Darrion Causey SRNA  Preanesthetic Checklist  Completed: patient identified, IV checked, site marked, risks and benefits discussed, surgical consent, monitors and equipment checked, pre-op evaluation and timeout performed  Prep:  Pt Position: supine  Sterile barriers:cap, gloves, mask and washed/disinfected hands  Prep: ChloraPrep  Patient monitoring: blood pressure monitoring, continuous pulse oximetry and EKG  Procedure    Sedation: yes  Performed under: local infiltration  Guidance:ultrasound guided    ULTRASOUND INTERPRETATION.  Using ultrasound guidance a 21 G gauge needle was placed in close proximity to the nerve, at which point, under ultrasound guidance anesthetic was injected in the area of the nerve and spread of the anesthesia was seen on ultrasound in close proximity thereto.  There were no abnormalities seen on ultrasound; a digital image was taken; and the patient tolerated the procedure with no complications. Images:still images obtained, printed/placed on chart    Laterality:left  Block Type:adductor canal block  Injection Technique:single-shot  Needle Type:echogenic  Needle Gauge:21 G  Resistance on Injection: none    Medications Used: bupivacaine PF (MARCAINE) injection 0.25% - Perineural, Adductor Canal   15 mL - 1/8/2024 9:00:00 AM  dexmedetomidine HCl (PRECEDEX) injection - Intravenous   30 mcg - 1/8/2024 9:00:00 AM      Post Assessment  Injection Assessment: negative aspiration for heme, no paresthesia on injection and incremental injection  Patient Tolerance:comfortable throughout block  Complications:no

## 2024-01-08 NOTE — INTERVAL H&P NOTE
H&P reviewed. The patient was examined and there are no changes to the H&P.  There were no vitals taken for this visit.

## 2024-01-08 NOTE — DISCHARGE INSTRUCTIONS
Total Knee Joint Replacement Discharge Instructions:    I. ACTIVITIES:  1. Exercises:  Complete exercise program as taught by the hospital physical therapist 2 times per day  Exercise program will be advanced by the physical therapist  During the day be up ambulating every 2 hours (while awake) for short distances  Complete the ankle pump exercises at least 10 times per hour (while awake)  Elevate legs most of the day the first week post operatively and thereafter elevate legs when in bed and for at least 30 minutes during the day. Caution must be taken to avoid pillow placement under the bend of the knee as this can led to flexion contractures of the knee.  Use cold packs 20-30 minutes approximately 5 times per day. This should be done before and after completing your exercises and at any time you are experiencing pain/ stiffness in your operative extremity.      2. Activities of Daily Living:  No tub baths, hot tubs, or swimming pools for 4 weeks  May shower and let water run over the incision on post-operative day #5 if no drainage. Do not scrub or rub the incision. Simply let the water run over the incision and pat dry.    II. Restrictions  Do not cross legs or kneel  Your surgeon will discuss with you when you will be able to drive again.  Weight bearing is as tolerated  First week stay inside on even terrain. May go up and down stairs one stair at a time utilizing the hand rail  After one week, you may venture outside.    III. Precautions:  Everyone that comes near you should wash their hands  No elective dental, genital-urinary, or colon procedures or surgical procedures for 12 weeks after surgery unless absolutely necessary.   If dental work or surgical procedure is deemed absolutely necessary, you will need to contact your surgeon as you will need to take antibiotics 1 hour prior to any dental work (including teeth cleanings).  Please discuss with your surgeon prophylactic antibiotics as the length of time  this intervention will be necessary for you varies with each patient’s health history and situation.  Avoid sick people. If you must be around someone who is ill, they should wear a mask.  Avoid visits to the Emergency Room or Urgent Care unless you are having a life threatening event.   If ordered stockings are to be placed on in the morning and removed at night. Monitor the stockings to ensure that any swelling is not causing the stockings to become too tight. In this case, remove stockings immediately.    IV. INCISION CARE:  May remove the Ace wrap 2 days following surgery  The negative pressure dressing with the battery pack is waterproof and should remain in place for 7 days.  You may shower with the negative pressure dressing as it is waterproof  Following removal of the dressing on day 7 you may shower and allow water to run over the incision  No submersion of incision in water such as tubs pools hot tubs etc.  No creams or ointments to the incision  Do not touch or pick at the incision  Check incision/dressing every day and notify surgeon immediately if any of the following signs or symptoms are noted:  Increase in redness  Increase in swelling around the incision and of the entire extremity  Increase in pain  Drainage oozing from the incision  Pulling apart of the edges of the incision  Increase in overall body temperature (greater than 100.5 degrees)  Your surgeon will instruct you regarding suture or staple removal    V. Medications:   1. Anticoagulants: You will be discharged on an anticoagulant. This is a prophylactic medication that helps prevent blood clots during your post-operative period. The type and length of dosage varies based on your individual needs, procedure performed, and surgeon’s preference.  While taking the anticoagulant, you should avoid taking any additional aspirin, ibuprofen (Advil or Motrin), Aleve (Naprosyn) or other non-steroidal anti-inflammatory medications.   Notify surgeon  immediately if any daly bleeding is noted in the urine, stool, emesis, or from the nose or the incision. Blood in the stool will often appear as black rather than red. Blood in urine may appear as pink. Blood in emesis may appear as brown/black like coffee grounds.  You will need to apply pressure for longer periods of time to any cuts or abrasions to stop bleeding  Avoid alcohol while taking anticoagulants    2. Stool Softeners: You will be at greater risk of constipation after surgery due to being less mobile and the pain medications.   Take stool softeners as instructed by your surgeon while on pain medications. Over the counter Colace 100 mg 1-2 capsules twice daily.   If stools become too loose or too frequent, please decreases the dosage or stop the stool softener.  If constipation occurs despite use of stool softeners, you are to continue the stool softeners and add a laxative (Milk of Magnesia 1 ounce daily as needed)  Drink plenty of fluids, and eat fruits and vegetables during your recovery time    3. Pain Medications utilized after surgery are narcotics and the law requires that the following information be given to all patients that are prescribed narcotics:  CLASSIFICATION: Pain medications are called Opioids and are narcotics  LEGALITIES: It is illegal to share narcotics with others and to drive within 24 hours of taking narcotics  POTENTIAL SIDE EFFECTS: Potential side effects of opioids include: nausea, vomiting, itching, dizziness, drowsiness, dry mouth, constipation, and difficulty urinating.  POTENTIAL ADVERSE EFFECTS:   Opioid tolerance can develop with use of pain medications and this simply means that it requires more and more of the medication to control pain; however, this is seen more in patients that use opioids for longer periods of time.  Opioid dependence can develop with use of Opioids and this simply means that to stop the medication can cause withdrawal symptoms; however, this is  seen with patients that use Opioids for longer periods of time.  Opioid addiction can develop with use of Opioids and the incidence of this is very unlikely in patients who take the medications as ordered and stop the medications as instructed.  Opioid overdose can be dangerous, but is unlikely when the medication is taken as ordered and stopped when ordered. It is important not to mix opioids with alcohol or with and type of sedative such as Benadryl as this can lead to over sedation and respiratory difficulty.  DOSAGE:   Pain medications will need to be taken consistently for the first week to decrease pain and promote adequate pain relief and participation in physical therapy.  After the initial surgical pain begins to resolve, you may begin to decrease the pain medication. By the end of 6 weeks, you should be off of pain medications.  Refills will not be given by the office during evening hours, on weekends, or after 6 weeks post-op.  To seek refills on pain medications during the initial 6 week post-operative period, you must call the office 48 hours in advance to request the refill. The office will then notify you when to  the prescription. DO NOT wait until you are out of the medication to request a refill.    V. FOLLOW-UP VISITS:  You will need to follow up in the office with your surgeon in 2 weeks. Please call this number [phone] to schedule this appointment.  If you have any concerns or suspected complications prior to your follow up visit, please call your surgeons office. Do not wait until your appointment time if you suspect complications. These will need to be addressed in the office promptly.

## 2024-01-08 NOTE — ANESTHESIA PROCEDURE NOTES
Peripheral Block    Pre-sedation assessment completed: 1/8/2024 8:57 AM    Patient reassessed immediately prior to procedure    Patient location during procedure: pre-op  Start time: 1/8/2024 9:02 AM  Stop time: 1/8/2024 9:07 AM  Reason for block: at surgeon's request and post-op pain management  Performed by  CRNA/CAA: Akua Cruz, NOHEMYSRNA: Darrion Causey SRNA  Preanesthetic Checklist  Completed: patient identified, IV checked, site marked, risks and benefits discussed, surgical consent, monitors and equipment checked, pre-op evaluation and timeout performed  Prep:  Pt Position: supine  Sterile barriers:cap, gloves, mask and washed/disinfected hands  Prep: ChloraPrep  Patient monitoring: blood pressure monitoring, continuous pulse oximetry and EKG  Procedure    Sedation: yes  Performed under: local infiltration  Guidance:ultrasound guided    ULTRASOUND INTERPRETATION.  Using ultrasound guidance a 21 G gauge needle was placed in close proximity to the nerve, at which point, under ultrasound guidance anesthetic was injected in the area of the nerve and spread of the anesthesia was seen on ultrasound in close proximity thereto.  There were no abnormalities seen on ultrasound; a digital image was taken; and the patient tolerated the procedure with no complications. Images:still images obtained, printed/placed on chart    Block Type:iPack  Injection Technique:single-shot  Needle Type:echogenic  Needle Gauge:21 G  Resistance on Injection: none    Medications Used: dexmedetomidine HCl (PRECEDEX) injection - Intravenous   40 mcg - 1/8/2024 9:07:00 AM  bupivacaine PF (MARCAINE) injection 0.25% - Injection   20 mL - 1/8/2024 9:07:00 AM      Post Assessment  Injection Assessment: negative aspiration for heme, no paresthesia on injection and incremental injection  Patient Tolerance:comfortable throughout block  Complications:no

## 2024-01-08 NOTE — ANESTHESIA PROCEDURE NOTES
Spinal Block    Pre-sedation assessment completed: 1/8/2024 10:07 AM    Patient reassessed immediately prior to procedure    Patient location during procedure: pre-op  Start Time: 1/8/2024 10:09 AM  Stop Time: 1/8/2024 10:11 AM  Indication:at surgeon's request and post-op pain management  Performed By  CRNA/CAA: Joana Villaseñor CRNASRNA: Rikki Bill SRNA  Preanesthetic Checklist  Completed: patient identified, IV checked, site marked, risks and benefits discussed, surgical consent, monitors and equipment checked, pre-op evaluation and timeout performed  Spinal Block Prep:  Patient Position:sitting  Sterile Tech:cap, gloves, mask and sterile barriers  Prep:Chloraprep  Patient Monitoring:blood pressure monitoring, continuous pulse oximetry and EKG    Spinal Block Procedure  Approach:midline  Guidance:landmark technique and palpation technique  Location:L3-L4  Needle Type:Sprotte  Needle Gauge:25 G  Placement of Spinal needle event:cerebrospinal fluid aspirated  Paresthesia: no  Fluid Appearance:clear  Medications: bupivacaine (MARCAINE) injection 0.5% - Injection   1.8 mL - 1/8/2024 10:09:00 AM   Post Assessment  Patient Tolerance:patient tolerated the procedure well with no apparent complications  Complications no

## 2024-01-09 ENCOUNTER — READMISSION MANAGEMENT (OUTPATIENT)
Dept: CALL CENTER | Facility: HOSPITAL | Age: 64
End: 2024-01-09
Payer: COMMERCIAL

## 2024-01-09 VITALS
WEIGHT: 196.4 LBS | HEIGHT: 69 IN | SYSTOLIC BLOOD PRESSURE: 148 MMHG | TEMPERATURE: 97.9 F | BODY MASS INDEX: 29.09 KG/M2 | RESPIRATION RATE: 18 BRPM | HEART RATE: 82 BPM | OXYGEN SATURATION: 99 % | DIASTOLIC BLOOD PRESSURE: 82 MMHG

## 2024-01-09 LAB
ANION GAP SERPL CALCULATED.3IONS-SCNC: 5.6 MMOL/L (ref 5–15)
BASOPHILS # BLD AUTO: 0.02 10*3/MM3 (ref 0–0.2)
BASOPHILS NFR BLD AUTO: 0.2 % (ref 0–1.5)
BUN SERPL-MCNC: 18 MG/DL (ref 8–23)
BUN/CREAT SERPL: 16.7 (ref 7–25)
CALCIUM SPEC-SCNC: 8.4 MG/DL (ref 8.6–10.5)
CHLORIDE SERPL-SCNC: 103 MMOL/L (ref 98–107)
CO2 SERPL-SCNC: 26.4 MMOL/L (ref 22–29)
CREAT SERPL-MCNC: 1.08 MG/DL (ref 0.76–1.27)
DEPRECATED RDW RBC AUTO: 44.8 FL (ref 37–54)
EGFRCR SERPLBLD CKD-EPI 2021: 78.1 ML/MIN/1.73
EOSINOPHIL # BLD AUTO: 0 10*3/MM3 (ref 0–0.4)
EOSINOPHIL NFR BLD AUTO: 0 % (ref 0.3–6.2)
ERYTHROCYTE [DISTWIDTH] IN BLOOD BY AUTOMATED COUNT: 14.9 % (ref 12.3–15.4)
GLUCOSE SERPL-MCNC: 156 MG/DL (ref 65–99)
HCT VFR BLD AUTO: 34.4 % (ref 37.5–51)
HGB BLD-MCNC: 10.2 G/DL (ref 13–17.7)
IMM GRANULOCYTES # BLD AUTO: 0.03 10*3/MM3 (ref 0–0.05)
IMM GRANULOCYTES NFR BLD AUTO: 0.3 % (ref 0–0.5)
LAB AP CASE REPORT: NORMAL
LAB AP DIAGNOSIS COMMENT: NORMAL
LYMPHOCYTES # BLD AUTO: 0.46 10*3/MM3 (ref 0.7–3.1)
LYMPHOCYTES NFR BLD AUTO: 4.6 % (ref 19.6–45.3)
MCH RBC QN AUTO: 24.5 PG (ref 26.6–33)
MCHC RBC AUTO-ENTMCNC: 29.7 G/DL (ref 31.5–35.7)
MCV RBC AUTO: 82.7 FL (ref 79–97)
MONOCYTES # BLD AUTO: 0.88 10*3/MM3 (ref 0.1–0.9)
MONOCYTES NFR BLD AUTO: 8.9 % (ref 5–12)
NEUTROPHILS NFR BLD AUTO: 8.52 10*3/MM3 (ref 1.7–7)
NEUTROPHILS NFR BLD AUTO: 86 % (ref 42.7–76)
NRBC BLD AUTO-RTO: 0 /100 WBC (ref 0–0.2)
PATH REPORT.FINAL DX SPEC: NORMAL
PATH REPORT.GROSS SPEC: NORMAL
PLATELET # BLD AUTO: 230 10*3/MM3 (ref 140–450)
PMV BLD AUTO: 11.4 FL (ref 6–12)
POTASSIUM SERPL-SCNC: 4.3 MMOL/L (ref 3.5–5.2)
RBC # BLD AUTO: 4.16 10*6/MM3 (ref 4.14–5.8)
SODIUM SERPL-SCNC: 135 MMOL/L (ref 136–145)
WBC NRBC COR # BLD AUTO: 9.91 10*3/MM3 (ref 3.4–10.8)

## 2024-01-09 PROCEDURE — G0378 HOSPITAL OBSERVATION PER HR: HCPCS

## 2024-01-09 PROCEDURE — 97161 PT EVAL LOW COMPLEX 20 MIN: CPT

## 2024-01-09 PROCEDURE — 85025 COMPLETE CBC W/AUTO DIFF WBC: CPT | Performed by: INTERNAL MEDICINE

## 2024-01-09 PROCEDURE — 25010000002 THIAMINE HCL 200 MG/2ML SOLUTION: Performed by: INTERNAL MEDICINE

## 2024-01-09 PROCEDURE — 80048 BASIC METABOLIC PNL TOTAL CA: CPT | Performed by: INTERNAL MEDICINE

## 2024-01-09 PROCEDURE — 97165 OT EVAL LOW COMPLEX 30 MIN: CPT

## 2024-01-09 PROCEDURE — 25010000002 CEFAZOLIN PER 500 MG: Performed by: INTERNAL MEDICINE

## 2024-01-09 RX ORDER — AMOXICILLIN 250 MG
1 CAPSULE ORAL DAILY
Qty: 30 TABLET | Refills: 0 | Status: SHIPPED | OUTPATIENT
Start: 2024-01-09 | End: 2024-01-15

## 2024-01-09 RX ORDER — ASPIRIN 81 MG/1
81 TABLET ORAL EVERY 12 HOURS SCHEDULED
Qty: 60 TABLET | Refills: 0 | Status: SHIPPED | OUTPATIENT
Start: 2024-01-09

## 2024-01-09 RX ORDER — OXYCODONE HYDROCHLORIDE AND ACETAMINOPHEN 5; 325 MG/1; MG/1
1 TABLET ORAL EVERY 4 HOURS PRN
Qty: 45 TABLET | Refills: 0 | Status: SHIPPED | OUTPATIENT
Start: 2024-01-09

## 2024-01-09 RX ORDER — ONDANSETRON 4 MG/1
4 TABLET, FILM COATED ORAL EVERY 8 HOURS PRN
Qty: 30 TABLET | Refills: 0 | Status: SHIPPED | OUTPATIENT
Start: 2024-01-09 | End: 2024-01-15

## 2024-01-09 RX ORDER — OXYCODONE HYDROCHLORIDE AND ACETAMINOPHEN 5; 325 MG/1; MG/1
1 TABLET ORAL EVERY 4 HOURS PRN
Qty: 45 TABLET | Refills: 0 | Status: SHIPPED | OUTPATIENT
Start: 2024-01-09 | End: 2024-01-09 | Stop reason: SDUPTHER

## 2024-01-09 RX ORDER — ONDANSETRON 4 MG/1
4 TABLET, FILM COATED ORAL EVERY 8 HOURS PRN
Qty: 30 TABLET | Refills: 0 | Status: SHIPPED | OUTPATIENT
Start: 2024-01-09 | End: 2024-01-09 | Stop reason: SDUPTHER

## 2024-01-09 RX ADMIN — ACETAMINOPHEN 1000 MG: 500 TABLET ORAL at 09:24

## 2024-01-09 RX ADMIN — ASPIRIN 81 MG: 81 TABLET, COATED ORAL at 09:24

## 2024-01-09 RX ADMIN — FAMOTIDINE 40 MG: 20 TABLET ORAL at 09:24

## 2024-01-09 RX ADMIN — FOLIC ACID 1 MG: 1 TABLET ORAL at 09:24

## 2024-01-09 RX ADMIN — CEFAZOLIN 2000 MG: 2 INJECTION, POWDER, FOR SOLUTION INTRAVENOUS at 02:39

## 2024-01-09 RX ADMIN — MELOXICAM 15 MG: 7.5 TABLET ORAL at 09:24

## 2024-01-09 RX ADMIN — THIAMINE HYDROCHLORIDE 200 MG: 100 INJECTION, SOLUTION INTRAMUSCULAR; INTRAVENOUS at 05:14

## 2024-01-09 NOTE — DISCHARGE SUMMARY
Orthopedic Discharge Summary      Patient: Surjit Stevenson      YOB: 1960    Medical Record Number: [id number]    Attending Physician: aIin Dowling MD  Consulting Physician(s):   Date of Admission: 2024  7:26 AM  Date of Discharge:     Active Hospital Problems    Diagnosis     **Effusion of left knee     Post-traumatic osteoarthritis of left knee     OA (osteoarthritis) of knee       Status Post: AL ARTHRP KNE CONDYLE&PLATU MEDIAL&LAT COMPARTMENTS [90537] (TOTAL KNEE ARTHROPLASTY WITH CORI ROBOT)      Allergies   Allergen Reactions    Chocolate Hives       Current Medications:     Discharge Medications        New Medications        Instructions Start Date   aspirin 81 MG EC tablet   81 mg, Oral, Every 12 Hours Scheduled      ondansetron 4 MG tablet  Commonly known as: Zofran   4 mg, Oral, Every 8 Hours PRN      oxyCODONE-acetaminophen 5-325 MG per tablet  Commonly known as: PERCOCET   1 tablet, Oral, Every 4 Hours PRN      sennosides-docusate 8.6-50 MG per tablet  Commonly known as: PERICOLACE   1 tablet, Oral, Daily             Continue These Medications        Instructions Start Date   celecoxib 100 MG capsule  Commonly known as: CeleBREX   100 mg, Oral, 2 Times Daily PRN             Stop These Medications      acetaminophen 500 MG tablet  Commonly known as: TYLENOL                  Past Medical History:   Diagnosis Date    Arthritis      Past Surgical History:   Procedure Laterality Date    HERNIA REPAIR      KNEE SURGERY Bilateral     1980s     Social History     Occupational History    Not on file   Tobacco Use    Smoking status: Former     Packs/day: 1.00     Years: 10.00     Additional pack years: 0.00     Total pack years: 10.00     Types: Cigarettes     Quit date:      Years since quittin.0     Passive exposure: Past    Smokeless tobacco: Never    Tobacco comments:     quit 30 yrs ago; was 1 ppd for 11 yrs   Vaping Use    Vaping Use: Never used   Substance and Sexual Activity     Alcohol use: Yes     Comment: not currently, previously abused alcohol    Drug use: No    Sexual activity: Defer      Social History     Social History Narrative    Not on file     Family History   Problem Relation Age of Onset    Hypertension Mother     Prostate cancer Father     Kidney cancer Father     Malig Hyperthermia Neg Hx          Physical Exam: 63 y.o. male  General Appearance:    Alert, cooperative, in no acute distress                      Vitals:    01/08/24 1600 01/08/24 1925 01/08/24 2000 01/09/24 0609   BP:  125/68  148/82   BP Location:  Right arm  Right arm   Patient Position:  Lying  Lying   Pulse:  79 71 82   Resp:  18  18   Temp:  96.6 °F (35.9 °C)  97.9 °F (36.6 °C)   TempSrc:  Oral  Oral   SpO2: 96% 97% 96% 99%   Weight:       Height:                General: No acute distress  Pulmonary: Nonlabored breathing  Cardiovascular: Regular rate and rhythm  LLE  Sensation normal  2+ dorsalis pedis and posterior tibial pulse  5 out of 5 ankle plantarflexion dorsiflexion inversion and eversion  Dressing clean dry and intact  No signs of DVT or compartment syndrome      Hospital Course:  63 y.o. male admitted to [facility] to services of Iain Dowling MD with Post-traumatic osteoarthritis of left knee [M17.32]  Effusion of left knee [M25.462]  OA (osteoarthritis) of knee [M17.9] on 1/8/2024 and underwent DE ARTHRP KNE CONDYLE&PLATU MEDIAL&LAT COMPARTMENTS [82808] (TOTAL KNEE ARTHROPLASTY WITH CORI ROBOT)  Per Iain Dowling MD. Antibiotic and VTE prophylaxis were per [facility]IP protocols. Post-operatively the patient transferred to the post-operative floor where the patient underwent mobilization therapy that included active as well as passive ROM exercises. Opioids were titrated to achieve appropriate pain management to allow for participation in mobilization exercises. Vital signs are now stable. The incision is intact without signs or symptoms of infection. Operative extremity neurovascular  status remains intact.   Appropriate education re: incision care, activity levels, medications, and follow up visits was completed and all questions were answered. The patient is now deemed stable for discharge to Home.      DIAGNOSTIC TESTS:     Admission on 01/08/2024   Component Date Value Ref Range Status    Glucose 01/09/2024 156 (H)  65 - 99 mg/dL Final    BUN 01/09/2024 18  8 - 23 mg/dL Final    Creatinine 01/09/2024 1.08  0.76 - 1.27 mg/dL Final    Sodium 01/09/2024 135 (L)  136 - 145 mmol/L Final    Potassium 01/09/2024 4.3  3.5 - 5.2 mmol/L Final    Chloride 01/09/2024 103  98 - 107 mmol/L Final    CO2 01/09/2024 26.4  22.0 - 29.0 mmol/L Final    Calcium 01/09/2024 8.4 (L)  8.6 - 10.5 mg/dL Final    BUN/Creatinine Ratio 01/09/2024 16.7  7.0 - 25.0 Final    Anion Gap 01/09/2024 5.6  5.0 - 15.0 mmol/L Final    eGFR 01/09/2024 78.1  >60.0 mL/min/1.73 Final    WBC 01/09/2024 9.91  3.40 - 10.80 10*3/mm3 Final    RBC 01/09/2024 4.16  4.14 - 5.80 10*6/mm3 Final    Hemoglobin 01/09/2024 10.2 (L)  13.0 - 17.7 g/dL Final    Hematocrit 01/09/2024 34.4 (L)  37.5 - 51.0 % Final    MCV 01/09/2024 82.7  79.0 - 97.0 fL Final    MCH 01/09/2024 24.5 (L)  26.6 - 33.0 pg Final    MCHC 01/09/2024 29.7 (L)  31.5 - 35.7 g/dL Final    RDW 01/09/2024 14.9  12.3 - 15.4 % Final    RDW-SD 01/09/2024 44.8  37.0 - 54.0 fl Final    MPV 01/09/2024 11.4  6.0 - 12.0 fL Final    Platelets 01/09/2024 230  140 - 450 10*3/mm3 Final    Neutrophil % 01/09/2024 86.0 (H)  42.7 - 76.0 % Final    Lymphocyte % 01/09/2024 4.6 (L)  19.6 - 45.3 % Final    Monocyte % 01/09/2024 8.9  5.0 - 12.0 % Final    Eosinophil % 01/09/2024 0.0 (L)  0.3 - 6.2 % Final    Basophil % 01/09/2024 0.2  0.0 - 1.5 % Final    Immature Grans % 01/09/2024 0.3  0.0 - 0.5 % Final    Neutrophils, Absolute 01/09/2024 8.52 (H)  1.70 - 7.00 10*3/mm3 Final    Lymphocytes, Absolute 01/09/2024 0.46 (L)  0.70 - 3.10 10*3/mm3 Final    Monocytes, Absolute 01/09/2024 0.88  0.10 - 0.90  10*3/mm3 Final    Eosinophils, Absolute 01/09/2024 0.00  0.00 - 0.40 10*3/mm3 Final    Basophils, Absolute 01/09/2024 0.02  0.00 - 0.20 10*3/mm3 Final    Immature Grans, Absolute 01/09/2024 0.03  0.00 - 0.05 10*3/mm3 Final    nRBC 01/09/2024 0.0  0.0 - 0.2 /100 WBC Final       No results found.    Discharge and Follow up Instructions:   Follow-up with me in the office in 2 weeks    Total Knee Joint Replacement Discharge Instructions:    I. ACTIVITIES:  1. Exercises:  Complete exercise program as taught by the hospital physical therapist 2 times per day  Exercise program will be advanced by the physical therapist  During the day be up ambulating every 2 hours (while awake) for short distances  Complete the ankle pump exercises at least 10 times per hour (while awake)  Elevate legs most of the day the first week post operatively and thereafter elevate legs when in bed and for at least 30 minutes during the day. Caution must be taken to avoid pillow placement under the bend of the knee as this can led to flexion contractures of the knee.  Use cold packs 20-30 minutes approximately 5 times per day. This should be done before and after completing your exercises and at any time you are experiencing pain/ stiffness in your operative extremity.      2. Activities of Daily Living:  No tub baths, hot tubs, or swimming pools for 4 weeks  May shower and let water run over the incision on post-operative day #5 if no drainage. Do not scrub or rub the incision. Simply let the water run over the incision and pat dry.    II. Restrictions  Do not cross legs or kneel  Your surgeon will discuss with you when you will be able to drive again.  Weight bearing is as tolerated  First week stay inside on even terrain. May go up and down stairs one stair at a time utilizing the hand rail  After one week, you may venture outside.    III. Precautions:  Everyone that comes near you should wash their hands  No elective dental, genital-urinary, or  colon procedures or surgical procedures for 12 weeks after surgery unless absolutely necessary.   If dental work or surgical procedure is deemed absolutely necessary, you will need to contact your surgeon as you will need to take antibiotics 1 hour prior to any dental work (including teeth cleanings).  Please discuss with your surgeon prophylactic antibiotics as the length of time this intervention will be necessary for you varies with each patient’s health history and situation.  Avoid sick people. If you must be around someone who is ill, they should wear a mask.  Avoid visits to the Emergency Room or Urgent Care unless you are having a life threatening event.   If ordered stockings are to be placed on in the morning and removed at night. Monitor the stockings to ensure that any swelling is not causing the stockings to become too tight. In this case, remove stockings immediately.    IV. INCISION CARE:  May remove the Ace wrap 2 days following surgery  The negative pressure dressing with the battery pack is waterproof and should remain in place for 7 days.  You may shower with the negative pressure dressing as it is waterproof  Following removal of the dressing on day 7 you may shower and allow water to run over the incision  No submersion of incision in water such as tubs pools hot tubs etc.  No creams or ointments to the incision  Do not touch or pick at the incision  Check incision/dressing every day and notify surgeon immediately if any of the following signs or symptoms are noted:  Increase in redness  Increase in swelling around the incision and of the entire extremity  Increase in pain  Drainage oozing from the incision  Pulling apart of the edges of the incision  Increase in overall body temperature (greater than 100.5 degrees)  Your surgeon will instruct you regarding suture or staple removal    V. Medications:   1. Anticoagulants: You will be discharged on an anticoagulant. This is a prophylactic  medication that helps prevent blood clots during your post-operative period. The type and length of dosage varies based on your individual needs, procedure performed, and surgeon’s preference.  While taking the anticoagulant, you should avoid taking any additional aspirin, ibuprofen (Advil or Motrin), Aleve (Naprosyn) or other non-steroidal anti-inflammatory medications.   Notify surgeon immediately if any daly bleeding is noted in the urine, stool, emesis, or from the nose or the incision. Blood in the stool will often appear as black rather than red. Blood in urine may appear as pink. Blood in emesis may appear as brown/black like coffee grounds.  You will need to apply pressure for longer periods of time to any cuts or abrasions to stop bleeding  Avoid alcohol while taking anticoagulants    2. Stool Softeners: You will be at greater risk of constipation after surgery due to being less mobile and the pain medications.   Take stool softeners as instructed by your surgeon while on pain medications. Over the counter Colace 100 mg 1-2 capsules twice daily.   If stools become too loose or too frequent, please decreases the dosage or stop the stool softener.  If constipation occurs despite use of stool softeners, you are to continue the stool softeners and add a laxative (Milk of Magnesia 1 ounce daily as needed)  Drink plenty of fluids, and eat fruits and vegetables during your recovery time    3. Pain Medications utilized after surgery are narcotics and the law requires that the following information be given to all patients that are prescribed narcotics:  CLASSIFICATION: Pain medications are called Opioids and are narcotics  LEGALITIES: It is illegal to share narcotics with others and to drive within 24 hours of taking narcotics  POTENTIAL SIDE EFFECTS: Potential side effects of opioids include: nausea, vomiting, itching, dizziness, drowsiness, dry mouth, constipation, and difficulty urinating.  POTENTIAL ADVERSE  EFFECTS:   Opioid tolerance can develop with use of pain medications and this simply means that it requires more and more of the medication to control pain; however, this is seen more in patients that use opioids for longer periods of time.  Opioid dependence can develop with use of Opioids and this simply means that to stop the medication can cause withdrawal symptoms; however, this is seen with patients that use Opioids for longer periods of time.  Opioid addiction can develop with use of Opioids and the incidence of this is very unlikely in patients who take the medications as ordered and stop the medications as instructed.  Opioid overdose can be dangerous, but is unlikely when the medication is taken as ordered and stopped when ordered. It is important not to mix opioids with alcohol or with and type of sedative such as Benadryl as this can lead to over sedation and respiratory difficulty.  DOSAGE:   Pain medications will need to be taken consistently for the first week to decrease pain and promote adequate pain relief and participation in physical therapy.  After the initial surgical pain begins to resolve, you may begin to decrease the pain medication. By the end of 6 weeks, you should be off of pain medications.  Refills will not be given by the office during evening hours, on weekends, or after 6 weeks post-op.  To seek refills on pain medications during the initial 6 week post-operative period, you must call the office 48 hours in advance to request the refill. The office will then notify you when to  the prescription. DO NOT wait until you are out of the medication to request a refill.    V. FOLLOW-UP VISITS:  You will need to follow up in the office with your surgeon in 2 weeks. Please call this number [phone] to schedule this appointment.  If you have any concerns or suspected complications prior to your follow up visit, please call your surgeons office. Do not wait until your appointment  time if you suspect complications. These will need to be addressed in the office promptly.        Date: 1/9/2024    Iain Dowling MD

## 2024-01-09 NOTE — DISCHARGE INSTR - APPOINTMENTS
Patient has follow up appointment with Dr. Dowling on 1/25/2024 at 9:30                   [phone]    Patient has follow up appointment with Dr. Tomlinson on 1/15/2024 at 11:15                  [phone]

## 2024-01-09 NOTE — THERAPY DISCHARGE NOTE
Acute Care - Occupational Therapy Initial Evaluation/Discharge   Ida Jo     Patient Name: Surjit Stevenson  : 1960  MRN: [id number]  Today's Date: 2024  Onset of Illness/Injury or Date of Surgery: 24  Date of Referral to OT: 24  Referring Physician: Dr Dowling      Admit Date: 2024       ICD-10-CM ICD-9-CM   1. Post-traumatic osteoarthritis of left knee  M17.32 715.26   2. Effusion of left knee  M25.462 719.06     Patient Active Problem List   Diagnosis    Primary osteoarthritis of left knee    Moderate episode of recurrent major depressive disorder    OA (osteoarthritis) of knee    Effusion of left knee    Iron deficiency anemia secondary to inadequate dietary iron intake    Post-traumatic osteoarthritis of left knee     Past Medical History:   Diagnosis Date    Arthritis      Past Surgical History:   Procedure Laterality Date    HERNIA REPAIR      KNEE SURGERY Bilateral     1980s    TOTAL KNEE ARTHROPLASTY Left 2024    Procedure: TOTAL KNEE ARTHROPLASTY WITH CORI ROBOT;  Surgeon: Iain Dowling MD;  Location: [facility];  Service: Robotics - Ortho;  Laterality: Left;       OT ASSESSMENT FLOWSHEET (last 12 hours)       OT Evaluation and Treatment       Row Name 24 1040                   OT Time and Intention    Subjective Information no complaints  -JJ        Document Type discharge evaluation/summary  -JJ        Mode of Treatment occupational therapy  -JJ        Patient Effort good  -JJ        Symptoms Noted During/After Treatment none  -JJ           General Information    Patient Profile Reviewed yes  -JJ        Onset of Illness/Injury or Date of Surgery 24  -JJ        Referring Physician Dr Dowling  -JJ        Patient/Family/Caregiver Comments/Observations pt reclined in bedside chair, agreed to evaluation  -JJ        Prior Level of Function independent:;all household mobility;transfer;ADL's;bed mobility  -JJ        Pertinent History of Current Functional Problem pt  sp THERESE TKA  -JJ        Existing Precautions/Restrictions fall  -JJ        Risks Reviewed patient:;increased discomfort  -JJ        Benefits Reviewed patient:;improve function;increase independence  -JJ        Barriers to Rehab none identified  -JJ           Living Environment    Current Living Arrangements home  -JJ        People in Home alone  -JJ           Home Main Entrance    Number of Stairs, Main Entrance ten  -JJ        Stair Railings, Main Entrance railings on both sides of stairs  -JJ           Stairs Within Home, Primary    Stairs, Within Home, Primary 1 story home with a basement  -JJ           Pain Assessment    Pretreatment Pain Rating 1/10  -JJ        Posttreatment Pain Rating 1/10  -JJ        Pain Location - Side/Orientation Left  -J        Pain Location incisional  -J        Pain Location - knee  -JJ        Pain Intervention(s) Repositioned;Cold applied;Ambulation/increased activity  -JJ           Cognition    Orientation Status (Cognition) oriented x 3  -JJ        Follows Commands (Cognition) WFL  -        Personal Safety Interventions fall prevention program maintained;nonskid shoes/slippers when out of bed  -JJ           Range of Motion (ROM)    Range of Motion bilateral upper extremities;ROM is WFL  -J           Strength (Manual Muscle Testing)    Strength (Manual Muscle Testing) bilateral upper extremities;strength is [facility]  -JJ           Sensory Assessment (Somatosensory)    Sensory Assessment (Somatosensory) sensation intact  -           Lower Body Dressing Assessment/Training    Comment, (Lower Body Dressing) anticipate pt will require min assist for lb adls. pt provided with sockaid for lb dressing. able to alyssa pants, shoes with CGA  -J           Bed Mobility    Comment, (Bed Mobility) deferred up in chair  -JJ           Functional Mobility    Functional Mobility- Ind. Level standby assist  -        Functional Mobility- Device walker, front-wheeled  -        Functional  Mobility-Distance (Feet) --  greater than 400 feet  -JJ           Sit-Stand Transfer    Sit-Stand Hooper (Transfers) standby assist  -JJ        Assistive Device (Sit-Stand Transfers) walker, front-wheeled  -JJ           Balance    Comment, Balance standing balance SBA  -JJ           Wound 01/08/24 Left anterior knee Incision    Wound - Properties Group Placement Date: 01/08/24  -NG Present on Original Admission: Y  -NG Side: Left  -NG Orientation: anterior  -NG Location: knee  -NG Primary Wound Type: Incision  -NG    Retired Wound - Properties Group Placement Date: 01/08/24  -NG Present on Original Admission: Y  -NG Side: Left  -NG Orientation: anterior  -NG Location: knee  -NG Primary Wound Type: Incision  -NG    Retired Wound - Properties Group Date first assessed: 01/08/24  -NG Present on Original Admission: Y  -NG Side: Left  -NG Location: knee  -NG Primary Wound Type: Incision  -NG       Plan of Care Review    Plan of Care Reviewed With patient  -JJ        Outcome Evaluation OT evaluation completed. pt required SBA for functional transfers and mobility greater than 400 feet with RW. pt states independent  with lb dressing with exception of donning socks. pt provided with and educated on sockaid. pt verbalizes understanding. pt states pain well controlled and plan is to discharge home today with out patient PT services  -JJ           Positioning and Restraints    Pre-Treatment Position sitting in chair/recliner  -JJ        Post Treatment Position chair  -JJ        In Chair call light within reach;encouraged to call for assist;sitting;with PT  -JJ           Therapy Assessment/Plan (OT)    Date of Referral to OT 01/08/24  -JJ        Patient/Family Therapy Goal Statement (OT) decrease pain  -JJ        Criteria for Skilled Therapeutic Interventions Met (OT) no;no problems identified which require skilled intervention  -JJ        Therapy Frequency (OT) evaluation only  -JJ           Evaluation Complexity (OT)     Overall Complexity of Evaluation (OT) low complexity  -ROCIO           Therapy Plan Review/Discharge Plan (OT)    Anticipated Discharge Disposition (OT) home with outpatient therapy services  -                  User Key  (r) = Recorded By, (t) = Taken By, (c) = Cosigned By      Initials Name Effective Dates    Fabienne Soria OTINGRID 06/16/21 -     Tanika Graham RN 07/19/23 -                     Occupational Therapy Education       Title: PT OT SLP Therapies (Done)       Topic: Occupational Therapy (Resolved)       Point: ADL training (Resolved)       Description:   Instruct learner(s) on proper safety adaptation and remediation techniques during self care or transfers.   Instruct in proper use of assistive devices.                  Learning Progress Summary             Patient Acceptance, E,TB, VU by ALYSON at 1/9/2024 1329    Comment: pt educated on adls and safety with functional transfers and mobility    Acceptance, E, VU by SD at 1/8/2024 1443    Comment: Education regarding OT services and the impact of TKA on daily tasks. Pt may benefit from adaptive equipment for LB adl's. Will continue to assess.   Family Acceptance, E, VU by SD at 1/8/2024 1443    Comment: Education regarding OT services and the impact of TKA on daily tasks. Pt may benefit from adaptive equipment for LB adl's. Will continue to assess.                                         User Key       Initials Effective Dates Name Provider Type Discipline    SD 06/16/21 -  Mariano Sheffield OTR Occupational Therapist OT    ALYSON 06/16/21 -  Fabienne Woods OTINGRID Occupational Therapist OT                    OT Recommendation and Plan  Therapy Frequency (OT): evaluation only  Plan of Care Review  Plan of Care Reviewed With: patient  Outcome Evaluation: OT evaluation completed. pt required SBA for functional transfers and mobility greater than 400 feet with RW. pt states independent  with lb dressing with exception of donning socks. pt  provided with and educated on sockaid. pt verbalizes understanding. pt states pain well controlled and plan is to discharge home today with out patient PT services  Plan of Care Reviewed With: patient  Outcome Evaluation: OT evaluation completed. pt required SBA for functional transfers and mobility greater than 400 feet with RW. pt states independent  with lb dressing with exception of donning socks. pt provided with and educated on sockaid. pt verbalizes understanding. pt states pain well controlled and plan is to discharge home today with out patient PT services          Outcome Measures       Row Name 01/09/24 1045             How much help from another is currently needed...    Putting on and taking off regular lower body clothing? 3  -JJ      Bathing (including washing, rinsing, and drying) 3  -JJ      Toileting (which includes using toilet bed pan or urinal) 3  -JJ      Putting on and taking off regular upper body clothing 4  -JJ      Taking care of personal grooming (such as brushing teeth) 4  -JJ      Eating meals 4  -JJ      AM-PAC 6 Clicks Score (OT) 21  -JJ         Functional Assessment    Outcome Measure Options AM-PAC 6 Clicks Daily Activity (OT)  -                User Key  (r) = Recorded By, (t) = Taken By, (c) = Cosigned By      Initials Name Provider Type    Fabienne Soria OTR Occupational Therapist                    Time Calculation:    Time Calculation- OT       Row Name 01/09/24 1331             Time Calculation- OT    OT Start Time 0920  -      OT Stop Time 0940  -      OT Time Calculation (min) 20 min  -                User Key  (r) = Recorded By, (t) = Taken By, (c) = Cosigned By      Initials Name Provider Type    Fabienne Soria OTR Occupational Therapist                    Therapy Charges for Today       Code Description Service Date Service Provider Modifiers Qty    [id number] HC OT EVAL LOW COMPLEXITY 2 1/9/2024 Fabienne Woods OTR GO 1                  OT Discharge Summary  Anticipated Discharge Disposition (OT): home with outpatient therapy services    Fabienne Woods, OTR  1/9/2024

## 2024-01-09 NOTE — CASE MANAGEMENT/SOCIAL WORK
Discharge Planning Assessment  LALITO Gallagher     Patient Name: Surjit Stevenson  MRN: [id number]  Today's Date: 1/9/2024    Admit Date: 1/8/2024    Plan: Home with friend and OP PT at [facility] rehab   Discharge Needs Assessment       Row Name 01/09/24 0920       Living Environment    People in Home alone    Current Living Arrangements home    Duration at Residence 5 Y    Potentially Unsafe Housing Conditions none    In the past 12 months has the electric, gas, oil, or water company threatened to shut off services in your home? No    Primary Care Provided by self    Provides Primary Care For no one    Caregiving Concerns no care giving concerns voiced by patient at this time.    Family Caregiver if Needed friend(s)    Family Caregiver Names Beto, friend    Quality of Family Relationships unable to assess    Able to Return to Prior Arrangements yes    Living Arrangement Comments Patient states he lives alone in a single story house with a basement and 10 steps with handrail to gain entry       Resource/Environmental Concerns    Resource/Environmental Concerns none    Transportation Concerns none       Transportation Needs    In the past 12 months, has lack of transportation kept you from medical appointments or from getting medications? no    In the past 12 months, has lack of transportation kept you from meetings, work, or from getting things needed for daily living? No       Food Insecurity    Within the past 12 months, you worried that your food would run out before you got the money to buy more. Never true    Within the past 12 months, the food you bought just didn't last and you didn't have money to get more. Never true       Transition Planning    Patient/Family Anticipates Transition to home    Patient/Family Anticipated Services at Transition durable medical equipment;outpatient care    Transportation Anticipated family or friend will provide  pt states his friend Beto will be able to provide ride home at discharge        Discharge Needs Assessment    Readmission Within the Last 30 Days no previous admission in last 30 days    Current Outpatient/Agency/Support Group --  none    Equipment Currently Used at Home crutches    Concerns to be Addressed adjustment to diagnosis/illness;discharge planning    Concerns Comments pt will need a RW and OP PT at discharge    Anticipated Changes Related to Illness none    Equipment Needed After Discharge walker, rolling    Outpatient/Agency/Support Group Needs outpatient therapy    Discharge Facility/Level of Care Needs outpatient therapy    Provided Post Acute Provider List? Refused    Refused Provider List Comment pt declined    Patient's Choice of Community Agency(s) none    Current Discharge Risk --  none    Discharge Coordination/Progress Patient states he plans on returning home at discharge with his friend Beto to help as needed and OP PT at [facility].                   Discharge Plan       Row Name 01/09/24 0924       Plan    Plan Home with friend and OP PT at [facility]    Patient/Family in Agreement with Plan yes    Plan Comments 0900am, into room and introduced self and role of CM. Discussed discharge disposition with patient at bedside. Patient is currently sitting up in the bed and voiced no complaints. Patient confirms that the info on his face sheet is correct and that he see's Dr. Felicia Tomlinson as PCP. He states he normally uses [facility] pharmacy in [place] and currently has no problem picking up or paying for his med's. He also states he does not have a living will and declines information regarding one. Patient states he lives alone in a single story house with 10 steps and handrail to gain entry and normally has no issues entering the home or maneuvering inside. He states he is independent with his ADL's, works and drives and his friend Beto will be able to provide ride home at discharge. He also states he has crutches at home and his rolling walker was delivered to him  and is currently in the room. Patient has an appointment at [facility] OP rehab for 1/10/24 @ 8:00am and is agreeable to this and declined the need for any other services at this time. Patient states he plans on returning home at discharge with his friend Beto to help as needed and OP PT. He had no other questions or concerns regarding discharge plans. Name and number placed on white board in room. CM will follow.                  Continued Care and Services - Admitted Since 1/8/2024       Durable Medical Equipment       Service Provider Request Status Selected Services Address Phone Fax Patient Preferred    [facility]  Selected Durable Medical Equipment [address], [place] [address] [phone] --                  Expected Discharge Date and Time       Expected Discharge Date Expected Discharge Time    Jan 9, 2024            Demographic Summary       Row Name 01/09/24 0920       General Information    Admission Type observation    Arrived From home    Referral Source admission list    Reason for Consult discharge planning    Preferred Language English       Contact Information    Permission Granted to Share Info With                    Functional Status    No documentation.                  Psychosocial    No documentation.                  Abuse/Neglect    No documentation.                  Legal    No documentation.                  Substance Abuse    No documentation.                  Patient Forms    No documentation.                     Elizabeth Rojo, RN

## 2024-01-09 NOTE — PLAN OF CARE
Goal Outcome Evaluation:  Plan of Care Reviewed With: patient        Progress: improving  Outcome Evaluation: POD #1. VSS. A&OX4. Room air. No complaints of pain. Ambulated in patel. CIWA 0. Dressing C/D/I. Ice packs changed. Sitting up in bed, watching TV.

## 2024-01-09 NOTE — PROGRESS NOTES
Patient: Surjit Cauble    @A@    Anesthesia Type: MAC, regional, spinal  Patient location: [facility] Surgical Floor  Last vitals  BP      Temp      Pulse     Resp      SpO2        Post vital signs: stable  Level of consciousness: awake, alert, and oriented    Post-anesthesia pain: adequate analgesia  Airway patency: patent  Respiratory: unassisted  Cardiovascular: stable and blood pressure at baseline  Hydration: euvolemic    Difficult Airway: no  Anesthetic complications: no

## 2024-01-09 NOTE — THERAPY DISCHARGE NOTE
Patient Name: Surjit Stevenson  : 1960    MRN: [id number]                              Today's Date: 2024       Admit Date: 2024    Visit Dx:     ICD-10-CM ICD-9-CM   1. Post-traumatic osteoarthritis of left knee  M17.32 715.26   2. Effusion of left knee  M25.462 719.06     Patient Active Problem List   Diagnosis    Primary osteoarthritis of left knee    Moderate episode of recurrent major depressive disorder    OA (osteoarthritis) of knee    Effusion of left knee    Iron deficiency anemia secondary to inadequate dietary iron intake    Post-traumatic osteoarthritis of left knee     Past Medical History:   Diagnosis Date    Arthritis      Past Surgical History:   Procedure Laterality Date    HERNIA REPAIR      KNEE SURGERY Bilateral     1980s    TOTAL KNEE ARTHROPLASTY Left 2024    Procedure: TOTAL KNEE ARTHROPLASTY WITH CORI ROBOT;  Surgeon: Iain Dowling MD;  Location: [facility];  Service: Robotics - Ortho;  Laterality: Left;      General Information       Row Name 24 1047          Physical Therapy Time and Intention    Document Type discharge evaluation/summary  -BP     Mode of Treatment physical therapy  -BP       Row Name 24 1047          General Information    Patient Profile Reviewed yes  Patient admitted s/p L TKA  -BP     Prior Level of Function independent:;community mobility;all household mobility;gait;transfer;ADL's;work  -BP     Existing Precautions/Restrictions fall  -BP     Barriers to Rehab none identified  -       Row Name 24 1047          Living Environment    People in Home alone  -       Row Name 24 1047          Home Main Entrance    Number of Stairs, Main Entrance ten  -BP     Stair Railings, Main Entrance railings on both sides of stairs  -BP       Row Name 24 1047          Stairs Within Home, Primary    Stairs, Within Home, Primary one story with a basement garage to enter home  -BP       Row Name 24 1047          Cognition     Orientation Status (Cognition) oriented x 3  -BP       Row Name 01/09/24 1047          Safety Issues, Functional Mobility    Comment, Safety Issues/Impairments (Mobility) WFL  -BP               User Key  (r) = Recorded By, (t) = Taken By, (c) = Cosigned By      Initials Name Provider Type    Noel Scott PT Physical Therapist                   Mobility       Row Name 01/09/24 1048          Bed Mobility    Comment, (Bed Mobility) deferred, patient up in chair  -BP       Row Name 01/09/24 1048          Transfers    Comment, (Transfers) verbal cues for hand placement  -BP       Row Name 01/09/24 1048          Sit-Stand Transfer    Sit-Stand [place] (Transfers) standby assist  -     Assistive Device (Sit-Stand Transfers) walker, front-wheeled  -BP       Row Name 01/09/24 1048          Gait/Stairs (Locomotion)    [place] Level (Gait) supervision;verbal cues  -     Assistive Device (Gait) walker, front-wheeled  -BP     Distance in Feet (Gait) greater than 400 feet  -BP     Deviations/Abnormal Patterns (Gait) gait speed decreased;stride length decreased  -BP     Bilateral Gait Deviations forward flexed posture  -BP     [place] Level (Stairs) contact guard  -BP     Handrail Location (Stairs) both sides  -BP     Number of Steps (Stairs) 10  -BP     Comment, (Gait/Stairs) Patient manages device safely. No loss of balance or knee buckling noted. Patient ascends/descends 10 stairs with two handrails.  -BP               User Key  (r) = Recorded By, (t) = Taken By, (c) = Cosigned By      Initials Name Provider Type    Noel Scott PT Physical Therapist                   Obj/Interventions       Row Name 01/09/24 1111          Range of Motion Comprehensive    Comment, General Range of Motion R LE AROM WFL. L ankle and hip AROM WFL. L knee not formerly assessed  -BP       Row Name 01/09/24 1111          Strength Comprehensive (MMT)    Comment, General Manual Muscle Testing (MMT) Assessment R LE  strength functional. L LE strength not formerly assessed. Able to perform LAQ  -BP       Row Name 01/09/24 1111          Balance    Comment, Balance sitting balance-independent. Standing balance-Supervision with device  -       Row Name 01/09/24 1111          Sensory Assessment (Somatosensory)    Sensory Assessment (Somatosensory) sensation intact  -BP               User Key  (r) = Recorded By, (t) = Taken By, (c) = Cosigned By      Initials Name Provider Type    BP Noel Landaverde, PT Physical Therapist                   Goals/Plan    No documentation.                  Clinical Impression       Row Name 01/09/24 1113          Pain    Pretreatment Pain Rating 1/10  -BP     Posttreatment Pain Rating 1/10  -BP     Pain Location - Side/Orientation Left  -BP     Pain Location incisional  -     Pain Location - hip  -BP     Pain Intervention(s) Repositioned;Cold applied;Ambulation/increased activity  -     Additional Documentation Pain Scale: Numbers Pre/Post-Treatment (Group)  -       Row Name 01/09/24 1113          Plan of Care Review    Plan of Care Reviewed With patient  -BP     Outcome Evaluation PT Evaluation Complete: Patient performs sit/from stand transfers with SBA and gait x greater than 400 feet with supervision with use of FWW. Patient manages device safely, no loss of balance or knee buckling noted. Patient ascends/descends 10 stairs with two handrails with CGA. Patient performs L LE TKA HEP x 10 reps, provided and reviewed written handout. Patient has no concerns for return home and will follow up with outpatient PT. No further inpatient skilled PT needs, anticipate discharge home today.  -       Row Name 01/09/24 1113          Therapy Assessment/Plan (PT)    Rehab Potential (PT) good, to achieve stated therapy goals  -     Criteria for Skilled Interventions Met (PT) no problems identified which require skilled intervention  -     Therapy Frequency (PT) evaluation only  -       Row Name  01/09/24 1113          Positioning and Restraints    Pre-Treatment Position sitting in chair/recliner  -BP     Post Treatment Position chair  -BP     In Chair notified nsg;reclined;call light within reach;encouraged to call for assist  -BP               User Key  (r) = Recorded By, (t) = Taken By, (c) = Cosigned By      Initials Name Provider Type    Noel Scott, PT Physical Therapist                   Outcome Measures       Row Name 01/09/24 1128          How much help from another person do you currently need...    Turning from your back to your side while in flat bed without using bedrails? 4  -BP     Moving from lying on back to sitting on the side of a flat bed without bedrails? 3  -BP     Moving to and from a bed to a chair (including a wheelchair)? 3  -BP     Standing up from a chair using your arms (e.g., wheelchair, bedside chair)? 3  -BP     Climbing 3-5 steps with a railing? 3  -BP     To walk in hospital room? 3  -BP     AM-PAC 6 Clicks Score (PT) 19  -BP     Highest Level of Mobility Goal 6 --> Walk 10 steps or more  -BP       Row Name 01/09/24 1128          PADD    Diagnosis 1  -BP     Gender 2  -BP     Age Group 2  -BP     Gait Distance 1  -BP     Assist Level 2  -BP     Patient Preference home with outpatient rehab  -BP       Row Name 01/09/24 1128          Functional Assessment    Outcome Measure Options PADD;AM-PAC 6 Clicks Basic Mobility (PT)  -BP               User Key  (r) = Recorded By, (t) = Taken By, (c) = Cosigned By      Initials Name Provider Type    Noel Scott, PT Physical Therapist                  Physical Therapy Education       Title: PT OT SLP Therapies (Done)       Topic: Physical Therapy (Done)       Point: Mobility training (Done)       Learning Progress Summary             Patient Acceptance, E,TB, VU by BP at 1/9/2024 1141                         Point: Home exercise program (Done)       Learning Progress Summary             Patient Acceptance, E,TB, VU by BP  at 1/9/2024 1141                                         User Key       Initials Effective Dates Name Provider Type Discipline     06/16/21 -  Noel Landaverde, PT Physical Therapist PT                  PT Recommendation and Plan     Plan of Care Reviewed With: patient  Outcome Evaluation: PT Evaluation Complete: Patient performs sit/from stand transfers with SBA and gait x greater than 400 feet with supervision with use of FWW. Patient manages device safely, no loss of balance or knee buckling noted. Patient ascends/descends 10 stairs with two handrails with CGA. Patient performs L LE TKA HEP x 10 reps, provided and reviewed written handout. Patient has no concerns for return home and will follow up with outpatient PT. No further inpatient skilled PT needs, anticipate discharge home today.     Time Calculation:   PT Evaluation Complexity  History, PT Evaluation Complexity: 1-2 personal factors and/or comorbidities  Examination of Body Systems (PT Eval Complexity): 1-2 elements  Clinical Presentation (PT Evaluation Complexity): stable  Clinical Decision Making (PT Evaluation Complexity): low complexity  Overall Complexity (PT Evaluation Complexity): low complexity     PT Charges       Row Name 01/09/24 1143             Time Calculation    Start Time 0920  -BP      Stop Time 0950  -BP      Time Calculation (min) 30 min  -BP      PT Received On 01/09/24  -BP                User Key  (r) = Recorded By, (t) = Taken By, (c) = Cosigned By      Initials Name Provider Type    BP Noel Landaverde, PT Physical Therapist                  Therapy Charges for Today       Code Description Service Date Service Provider Modifiers Qty    [id number] HC PT EVAL LOW COMPLEXITY 2 1/9/2024 Noel Landaverde, PT GP 1            PT G-Codes  Outcome Measure Options: PADD, AM-PAC 6 Clicks Basic Mobility (PT)  AM-PAC 6 Clicks Score (PT): 19    PT Discharge Summary  Anticipated Discharge Disposition (PT): home with outpatient therapy  services  Reason for Discharge: Discharge from facility  Discharge Destination: Home with outpatient services    Noel Landaverde, PT  1/9/2024

## 2024-01-09 NOTE — PROGRESS NOTES
"DAILY PROGRESS NOTE  [facility]  0  ORTHOPEDIC SURGERY DAILY PROGRESS NOTE  [facility]  LENGTH OF STAY 0 DAYS      Patient Identification:  Name: Surjit Stevenson  Age: 63 y.o.  Sex: male  :  1960  MRN: [id number]         Primary Care Physician: Felicia Tomlinson MD      Subjective:  Interval History: No issues overnight, patient states he is required no pain medication.  Thrilled with the result so far.    Objective:    Scheduled Meds:acetaminophen, 1,000 mg, Oral, TID  aspirin, 81 mg, Oral, Q12H  Chlorhexidine Gluconate Cloth, , Apply externally, Once  famotidine, 40 mg, Oral, Daily  folic acid, 1 mg, Oral, Daily  LORazepam, 2 mg, Oral, Q6H   Followed by  LORazepam, 1 mg, Oral, Q6H  meloxicam, 15 mg, Oral, Daily  thiamine (B-1) IV, 200 mg, Intravenous, Q8H   Followed by  [START ON 2024] thiamine, 100 mg, Oral, Daily      Continuous Infusions:lactated ringers, 9 mL/hr, Last Rate: Stopped (24 1254)  lactated ringers, 100 mL/hr, Last Rate: Stopped (24 1734)        Vital signs in last 24 hours:  Temp:  [96.6 °F (35.9 °C)-98.3 °F (36.8 °C)] 97.9 °F (36.6 °C)  Heart Rate:  [] 82  Resp:  [14-18] 18  BP: ()/(59-88) 148/82    Intake/Output:    Intake/Output Summary (Last 24 hours) at 2024 0744  Last data filed at 2024 1800  Gross per 24 hour   Intake 600 ml   Output 100 ml   Net 500 ml       Exam:  /82 (BP Location: Right arm, Patient Position: Lying)   Pulse 82   Temp 97.9 °F (36.6 °C) (Oral)   Resp 18   Ht 175.3 cm (69\")   Wt 89.1 kg (196 lb 6.4 oz)   SpO2 99%   BMI 29.00 kg/m²   General: No acute distress, Aox3  Pulmonary: Unlabored breathing  Cardiovascular: Regular rate and rhythm   LLE  Sensation normal  2+ dorsalis pedis and posterior tibial pulse  5 out of 5 ankle plantarflexion dorsiflexion inversion and eversion  Dressing clean dry and intact  No signs of DVT or compartment syndrome                Data Review:  Lab Results " "  Component Value Date    CALCIUM 8.4 (L) 01/09/2024     Results from last 7 days   Lab Units 01/09/24  0352   SODIUM mmol/L 135*   POTASSIUM mmol/L 4.3   CHLORIDE mmol/L 103   CO2 mmol/L 26.4   BUN mg/dL 18   CREATININE mg/dL 1.08   GLUCOSE mg/dL 156*   CALCIUM mg/dL 8.4*   WBC 10*3/mm3 9.91   HEMOGLOBIN g/dL 10.2*   PLATELETS 10*3/mm3 230     No results found for: \"CKTOTAL\", \"CKMB\", \"CKMBINDEX\", \"TROPONINI\", \"TROPONINT\"  Estimated Creatinine Clearance: 79.3 mL/min (by C-G formula based on SCr of 1.08 mg/dL).  WEIGHTS:     Wt Readings from Last 1 Encounters:   01/08/24 0810 89.1 kg (196 lb 6.4 oz)         Assessment:    Effusion of left knee    OA (osteoarthritis) of knee    Post-traumatic osteoarthritis of left knee      Plan:  63 y.o. male  POD 1 left TKA  PT  Out of bed  WBAT left lower extremity  DVT PPX: Asprin  DME orders placed  DC med rec done  DC home today  Follow up in the office in 2 weeks          Iain Dowling MD  1/9/2024  07:44 EST    "

## 2024-01-09 NOTE — PLAN OF CARE
Goal Outcome Evaluation:  Plan of Care Reviewed With: patient           Outcome Evaluation: PT Evaluation Complete: Patient performs sit/from stand transfers with SBA and gait x greater than 400 feet with supervision with use of FWW. Patient manages device safely, no loss of balance or knee buckling noted. Patient ascends/descends 10 stairs with two handrails with CGA. Patient performs L LE TKA HEP x 10 reps, provided and reviewed written handout. Patient has no concerns for return home and will follow up with outpatient PT. No further inpatient skilled PT needs, anticipate discharge home today.      Anticipated Discharge Disposition (PT): home with outpatient therapy services

## 2024-01-09 NOTE — CASE MANAGEMENT/SOCIAL WORK
Case Management Discharge Note      Final Note: Discharged home.    Provided Post Acute Provider List?: Refused  Refused Provider List Comment: pt dunia    Selected Continued Care - Discharged on 1/9/2024 Admission date: 1/8/2024 - Discharge disposition: Home or Self Care      Destination    No services have been selected for the patient.                Durable Medical Equipment       Service Provider Selected Services Address Phone Fax Patient Preferred    [facility] [address] [place] [id number] [phone] --              Dialysis/Infusion    No services have been selected for the patient.                Home Medical Care    No services have been selected for the patient.                Therapy    No services have been selected for the patient.                Community Resources    No services have been selected for the patient.                Community & DME    No services have been selected for the patient.                         Final Discharge Disposition Code: 01 - home or self-care

## 2024-01-09 NOTE — PLAN OF CARE
Goal Outcome Evaluation:  Plan of Care Reviewed With: patient           Outcome Evaluation: OT evaluation completed. pt required SBA for functional transfers and mobility greater than 400 feet with RW. pt states independent  with lb dressing with exception of donning socks. pt provided with and educated on sockaid. pt verbalizes understanding. pt states pain well controlled and plan is to discharge home today with out patient PT services      Anticipated Discharge Disposition (OT): home with outpatient therapy services

## 2024-01-10 ENCOUNTER — TELEPHONE (OUTPATIENT)
Dept: ORTHOPEDIC SURGERY | Facility: CLINIC | Age: 64
End: 2024-01-10
Payer: COMMERCIAL

## 2024-01-10 ENCOUNTER — HOSPITAL ENCOUNTER (OUTPATIENT)
Dept: PHYSICAL THERAPY | Facility: HOSPITAL | Age: 64
Setting detail: THERAPIES SERIES
Discharge: HOME | End: 2024-01-10
Payer: COMMERCIAL

## 2024-01-10 ENCOUNTER — TRANSITIONAL CARE MANAGEMENT TELEPHONE ENCOUNTER (OUTPATIENT)
Dept: CALL CENTER | Facility: HOSPITAL | Age: 64
End: 2024-01-10
Payer: COMMERCIAL

## 2024-01-10 DIAGNOSIS — Z96.652 STATUS POST TOTAL KNEE REPLACEMENT, LEFT: Primary | ICD-10-CM

## 2024-01-10 PROCEDURE — 97161 PT EVAL LOW COMPLEX 20 MIN: CPT

## 2024-01-10 NOTE — THERAPY EVALUATION
Outpatient Physical Therapy Ortho Initial Evaluation   Ida Jo     Patient Name: Surjit Stevenson  : 1960  MRN: [id number]  Today's Date: 1/10/2024      Visit Date: 01/10/2024    Patient Active Problem List   Diagnosis    Primary osteoarthritis of left knee    Moderate episode of recurrent major depressive disorder    OA (osteoarthritis) of knee    Effusion of left knee    Iron deficiency anemia secondary to inadequate dietary iron intake    Post-traumatic osteoarthritis of left knee        Past Medical History:   Diagnosis Date    Arthritis         Past Surgical History:   Procedure Laterality Date    HERNIA REPAIR      KNEE SURGERY Bilateral     1980s    TOTAL KNEE ARTHROPLASTY Left 2024    Procedure: TOTAL KNEE ARTHROPLASTY WITH CORI ROBOT;  Surgeon: Iain Dowling MD;  Location: [facility];  Service: Robotics - Ortho;  Laterality: Left;       Visit Dx:     ICD-10-CM ICD-9-CM   1. Status post total knee replacement, left  Z96.652 V43.65          Patient History       Row Name 01/10/24 0700             History    Chief Complaint Difficulty Walking;Difficulty with daily activities;Joint stiffness;Joint swelling;Muscle tenderness;Muscle weakness;Pain  -AS      Type of Pain Knee pain  -AS      Date Current Problem(s) Began 24  -AS      Brief Description of Current Complaint Surjit Stevenson presents to outpatient PT s/p left TKA due to end stage OA. Patient’s surgery was performed on 24 by Dr. Dowling. Patient was D/C home on 24 with referral for outpatient PT. Patient is WBAT to LLE and is using a FWW for ambulation at this time. Patient states pain is well controlled with the use of ice and medication.  -AS      Previous treatment for THIS PROBLEM Surgery;Medication  -AS      Surgery Date: 24  -AS      Patient/Caregiver Goals Relieve pain;Return to prior level of function;Improve mobility;Improve strength;Decrease swelling  -AS      Patient's Rating of General Health Good  -AS       Patient seeing anyone else for problem(s)? Dr. Dowling  -AS      How has patient tried to help current problem? rest, medication, Ice, Ace Wrap, FWW  -AS      What clinical tests have you had for this problem? X-ray  -AS      Results of Clinical Tests End Stage OA  -AS      Surgery/Hospitalization Left TKA 1-8-24  -AS         Pain     Pain Location Knee  -AS      Pain Frequency Constant/continuous  -AS      Pain Description Aching  -AS      What Performance Factors Make the Current Problem(s) WORSE? Activity  -AS      What Performance Factors Make the Current Problem(s) BETTER? Rest  -AS         Daily Activities    Primary Language English  -AS      Are you able to read Yes  -AS      Are you able to write Yes  -AS      How does patient learn best? Listening;Reading;Demonstration  -AS      Teaching needs identified Home Exercise Program;Management of Condition  -AS      Patient is concerned about/has problems with Climbing Stairs;Difficulty with self care (i.e. bathing, dressing, toileting:;Flexibility;Performing home management (household chores, shopping, care of dependents);Performing job responsibilities/community activities (work, school,;Performing sports, recreation, and play activities;Walking  -AS      Does patient have problems with the following? None  -AS      Barriers to learning None  -AS      Pt Participated in POC and Goals Yes  -AS         Safety    Are you being hurt, hit, or frightened by anyone at home or in your life? No  -AS      Are you being neglected by a caregiver No  -AS                User Key  (r) = Recorded By, (t) = Taken By, (c) = Cosigned By      Initials Name Provider Type    AS Pierce Alvarado, PT Physical Therapist                     PT Ortho       Row Name 01/10/24 0700       Precautions and Contraindications    Precautions/Limitations no known precautions/limitations  -AS       Posture/Observations    Posture- WNL Posture is WNL  -AS    Observations  Ecchymosis/bruising;Edema;Incision non-healing  -AS       Patellar Accessory Motions    Superior glide Left:;Hypomobile  -AS    Inferior glide Left:;Hypomobile  -AS    Medial glide Left:;Hypomobile  -AS    Lateral glide Left:;Hypomobile  -AS       Left Lower Ext    Lt Knee Extension/Flexion AROM 0-91 degrees post stretch  0-9-62 degrees pre stretch  -AS       MMT Left Lower Ext    Lt Hip Flexion MMT, Gross Movement (4-/5) good minus  -AS    Lt Hip Extension MMT, Gross Movement (4-/5) good minus  -AS    Lt Hip ABduction MMT, Gross Movement (4-/5) good minus  -AS    Lt Knee Extension MMT, Gross Movement (4-/5) good minus  -AS    Lt Knee Flexion MMT, Gross Movement (4-/5) good minus  -AS       Sensation    Sensation WNL? WNL  -AS    Light Touch No apparent deficits  -AS       Lower Extremity Flexibility    Hamstrings Left:;Mildly limited  -AS       Gait/Stairs (Locomotion)    Comment, (Gait/Stairs) Patient ambulates with FWW at this time. Patient has decreased heel strike with shortened step and stride length on left side.  -AS              User Key  (r) = Recorded By, (t) = Taken By, (c) = Cosigned By      Initials Name Provider Type    AS Pierce Alvarado, PT Physical Therapist                                Therapy Education  Given: HEP, Symptoms/condition management, Pain management, Mobility training, Edema management  Program: New  How Provided: Verbal, Demonstration, Written  Provided to: Patient  Level of Understanding: Teach back education performed, Verbalized, Demonstrated      PT OP Goals       Row Name 01/10/24 0700          PT Short Term Goals    STG Date to Achieve 01/31/24  -AS     STG 1 Patient to demonstrate compliance with initial HEP for flexibility, ROM and strengthening.  -AS     STG 2 Patient to report left knee pain on VAS of 4-5/10 at worst with activity.  -AS     STG 3 Patient to demonstrate improved left knee and LLE strength to 4/5 in all planes.  -AS     STG 4 Patient to demonstrate  improved left knee ROM to 0-1-110 degrees.  -AS     STG 5 Patient to ambulate short distances without the use of an AD and with an improved gait pattern.  -AS        Long Term Goals    LTG Date to Achieve 02/21/24  -AS     LTG 1 Patient to demonstrate compliance with advanced HEP for flexibility, ROM and strengthening.  -AS     LTG 2 Patient to report left knee pain on VAS of 1-2/10 at worst with activity.  -AS     LTG 3 Patient to demonstrate improved left knee and LLE strength to 4+/5 in all planes.  -AS     LTG 4 Patient to demonstrate improved left knee ROM to 0-120 degrees.  -AS     LTG 5 Patient to ambulate community distances without an AD and with a normal gait pattern.  -AS     LTG 6 Patient to report overall improved function on LEFS to 55/80.  -AS        Time Calculation    PT Goal Re-Cert Due Date 02/07/24  -AS               User Key  (r) = Recorded By, (t) = Taken By, (c) = Cosigned By      Initials Name Provider Type    AS Pierce Alvarado, PT Physical Therapist                     PT Assessment/Plan       Row Name 01/10/24 0700          PT Assessment    Functional Limitations Impaired gait;Impaired locomotion;Limitation in home management;Limitations in community activities;Performance in leisure activities;Performance in self-care ADL;Performance in sport activities;Performance in work activities  -AS     Impairments Edema;Gait;Impaired flexibility;Muscle strength;Pain;Range of motion  -AS     Assessment Comments Surjit Stevenson presents to outpatient PT s/p left TKA due to end stage OA. Patient’s surgery was performed on 1-8-24 by Dr. Dowling. Patient was D/C home on 1-9-24 with referral for outpatient PT. Patient is WBAT to LLE and is using a FWW for ambulation at this time. Patient states pain is well controlled with the use of ice and medication. Patient has limited left knee ROM, limited left LE strength, and increased left knee pain and edema with activity. Patient has limited function at this  time secondary to the above.  -AS     Please refer to paper survey for additional self-reported information Yes  -AS     Rehab Potential Good  -AS     Patient/caregiver participated in establishment of treatment plan and goals Yes  -AS     Patient would benefit from skilled therapy intervention Yes  -AS        PT Plan    PT Frequency 2x/week  -AS     Predicted Duration of Therapy Intervention (PT) 6-8 weeks  -AS     Planned CPT's? PT RE-EVAL: 17875;PT THER PROC EA 15 MIN: 98707;PT THER ACT EA 15 MIN: 97599;PT MANUAL THERAPY EA 15 MIN: 56462;PT NEUROMUSC RE-EDUCATION EA 15 MIN: 78506;PT GAIT TRAINING EA 15 MIN: 86629  -AS               User Key  (r) = Recorded By, (t) = Taken By, (c) = Cosigned By      Initials Name Provider Type    AS Pierce Alvarado, PT Physical Therapist                       OP Exercises       Row Name 01/10/24 0700             Subjective Pain    Able to rate subjective pain? yes  -AS      Pre-Treatment Pain Level 8  -AS      Post-Treatment Pain Level 8  -AS         Exercise 1    Exercise Name 1 Heel Slides  -AS      Time 1 8 min  -AS         Exercise 2    Exercise Name 2 Wall Slides  -AS         Exercise 3    Exercise Name 3 Bike  -AS         Exercise 4    Exercise Name 4 Manual Flexion  -AS      Reps 4 10  -AS      Time 4 10 sec hold each  -AS         Exercise 5    Exercise Name 5 HS Stretch  -AS      Reps 5 10  -AS      Time 5 10 sec hold each  -AS         Exercise 6    Exercise Name 6 Heel Prop  -AS      Time 6 5 min  -AS         Exercise 7    Exercise Name 7 Manual Extension  -AS      Reps 7 10  -AS      Time 7 10 sec hold each  -AS         Exercise 8    Exercise Name 8 QS  -AS      Reps 8 25  -AS      Time 8 5 sec hold each  -AS         Exercise 9    Exercise Name 9 3-Way Hip  -AS      Reps 9 25 each  -AS         Exercise 10    Exercise Name 10 LAQ  -AS      Reps 10 25  -AS         Exercise 11    Exercise Name 11 TKE  -AS         Exercise 12    Exercise Name 12 Heel Raises  -AS          Exercise 13    Exercise Name 13 Mini Squats  -AS         Exercise 14    Exercise Name 14 FWD Step Ups  -AS         Exercise 15    Exercise Name 15 Lateral Step Overs  -AS                User Key  (r) = Recorded By, (t) = Taken By, (c) = Cosigned By      Initials Name Provider Type    AS Pierce Alvarado, PT Physical Therapist                                  Outcome Measure Options: Lower Extremity Functional Scale (LEFS)  Lower Extremity Functional Index  Any of your usual work, housework or school activities: Quite a bit of difficulty  Your usual hobbies, recreational or sporting activities: Quite a bit of difficulty  Getting into or out of the bath: Moderate difficulty  Walking between rooms: A little bit of difficulty  Putting on your shoes or socks: A little bit of difficulty  Squatting: Quite a bit of difficulty  Lifting an object, like a bag of groceries from the floor: A little bit of difficulty  Performing light activities around your home: A little bit of difficulty  Performing heavy activities around your home: Moderate difficulty  Getting into or out of a car: Moderate difficulty  Walking 2 blocks: Moderate difficulty  Walking a mile: Quite a bit of difficulty  Going up or down 10 stairs (about 1 flight of stairs): A little bit of difficulty  Standing for 1 hour: Quite a bit of difficulty  Sitting for 1 hour: No difficulty  Running on even ground: Quite a bit of difficulty  Running on uneven ground: Quite a bit of difficulty  Making sharp turns while running fast: Quite a bit of difficulty  Hopping: Quite a bit of difficulty  Rolling over in bed: Moderate difficulty  Total: 38  Lower Extremity Functional Index  Any of your usual work, housework or school activities: Quite a bit of difficulty  Your usual hobbies, recreational or sporting activities: Quite a bit of difficulty  Getting into or out of the bath: Moderate difficulty  Walking between rooms: A little bit of difficulty  Putting on your  shoes or socks: A little bit of difficulty  Squatting: Quite a bit of difficulty  Lifting an object, like a bag of groceries from the floor: A little bit of difficulty  Performing light activities around your home: A little bit of difficulty  Performing heavy activities around your home: Moderate difficulty  Getting into or out of a car: Moderate difficulty  Walking 2 blocks: Moderate difficulty  Walking a mile: Quite a bit of difficulty  Going up or down 10 stairs (about 1 flight of stairs): A little bit of difficulty  Standing for 1 hour: Quite a bit of difficulty  Sitting for 1 hour: No difficulty  Running on even ground: Quite a bit of difficulty  Running on uneven ground: Quite a bit of difficulty  Making sharp turns while running fast: Quite a bit of difficulty  Hopping: Quite a bit of difficulty  Rolling over in bed: Moderate difficulty  Total: 38      Time Calculation:     Start Time: 0751  Stop Time: 0900  Time Calculation (min): 69 min     Therapy Charges for Today       Code Description Service Date Service Provider Modifiers Qty    74262107978 HC PT EVAL LOW COMPLEXITY 4 1/10/2024 Pierce Alvarado, PT GP 1            PT G-Codes  Outcome Measure Options: Lower Extremity Functional Scale (LEFS)  Total: 38         Pierce Alvarado, PT  1/10/2024

## 2024-01-10 NOTE — OUTREACH NOTE
Prep Survey      Flowsheet Row Responses   [facility] patient discharged from? [place]   Is LACE score < 7 ? Yes   Eligibility [facility] [place]   Date of Admission 01/08/24   Date of Discharge 01/09/24   Discharge Disposition Home-Health Care Sv   Discharge diagnosis NE ARTHRP KNE CONDYLE&PLATU MEDIAL&LAT COMPARTMENTS (01821) (TOTAL KNEE ARTHROPLASTY WITH CORI ROBOT)   Does the patient have one of the following disease processes/diagnoses(primary or secondary)? Total Joint Replacement   Does the patient have Home health ordered? Yes   Is there a DME ordered? Yes   What DME was ordered? [employer]   Prep survey completed? Yes            JENNY MOYER - Registered Nurse          
Statement Selected

## 2024-01-10 NOTE — TELEPHONE ENCOUNTER
Patient is s/p total knee arthroplasty on 1/8/24.  He called saying he can't remember when he is able to remove his dressing.  Explained to him that he can remove after 7 days and has the option to leave it to air or wrap with and ACE.  Patient understood and will call with any other questions.

## 2024-01-10 NOTE — OUTREACH NOTE
Call Center TCM Note      Flowsheet Row Responses   [facility] patient discharged from? [place]   Does the patient have one of the following disease processes/diagnoses(primary or secondary)? Total Joint Replacement   Joint surgery performed? Knee   TCM attempt successful? Yes   Call start time 1252   Call end time 1259   Has the patient been back in either the hospital or Emergency Department since discharge? No   Discharge diagnosis [place] ARTHRP KNE CONDYLE&PLATU MEDIAL&LAT COMPARTMENTS (72463) (TOTAL KNEE ARTHROPLASTY WITH CORI ROBOT)   Does the patient have all medications related to this admission filled (includes all antibiotics, pain medications, etc.) Yes   Is the patient taking all medications as directed (includes completed medication regime)? Yes   Is the patient able to teach back alternate methods of pain control? Ice, Reposition, Correct alignment, Knee-elevation/no pillow under knee, Short, frequent activity   Comments TCM APPT WITH PCP DR SORENSEN IS 01/15/2024   Does the patient have an appointment with their PCP within 7-14 days of discharge? Yes   Has home health visited the patient within 72 hours of discharge? N/A   Home health comments Pt started outpt P.T. today   What DME was ordered? [facility]   Has all DME been delivered? Yes   Psychosocial issues? No   Did the patient receive a copy of their discharge instructions? Yes   Nursing interventions Reviewed instructions with patient   What is the patient's perception of their functional status since discharge? Improving   Is the patient able to teach back signs and symptoms of infection? Temp >100.4 for 24h or longer, Blisters around incision, Severe discomfort or pain, Shortness of breath or chest pain, Changes in mobility, Increased swelling or redness around incision (not associated with surgical edema), Incisional drainage   Is the patient able to teach back how to prevent infection? Check incision daily, Shower only as directed by  surgeon, No lotion or creams, Wash hands before and after touching incision, Monitor blood sugar if diabetic, Eat well-balanced diet, Keep incision covered if drainage, Keep incision covered if pets in house, No tub baths, hot tub or swimming   Is the patient able to teach back signs and symptoms of DVT? Redness in calf, Swelling in calf, Area hot to touch, Severe pain in calf, Shortness of breath or chest pain   Is the patient able to teach back home safety measures? Ability to shower, Modifications with ADLs such as dressing, cooking, toileting, Accessibility to necessary areas in home, Modifications to reach items   Did the patient implement home safety suggestions from pre-surgery classes if attended? N/A   If the patient is a current smoker, are they able to teach back resources for cessation? Not a smoker   Is the patient/caregiver able to teach back the hierarchy of who to call/visit for symptoms/problems? PCP, Specialist, Home health nurse, Urgent Care, ED, 911 Yes   TCM call completed? Yes   Wrap up additional comments D/C DX: TKR LT - Pt doing very well. He started outpt P.T. today. asa, Oxycodone, Pericolace, Zofran in place as needed. No questions or concerns. TCM APPT with PCP Dr Tomlinson is 01/15/2024, first POST OP appt with surgeon is 01/25/2024.   Call end time [id number]            Felicia Fletcher MA    1/10/2024, 13:05 EST

## 2024-01-12 ENCOUNTER — HOSPITAL ENCOUNTER (OUTPATIENT)
Dept: PHYSICAL THERAPY | Facility: HOSPITAL | Age: 64
Setting detail: THERAPIES SERIES
Discharge: HOME | End: 2024-01-12
Payer: COMMERCIAL

## 2024-01-12 DIAGNOSIS — Z96.652 STATUS POST TOTAL KNEE REPLACEMENT, LEFT: Primary | ICD-10-CM

## 2024-01-12 PROCEDURE — 97110 THERAPEUTIC EXERCISES: CPT

## 2024-01-12 NOTE — THERAPY TREATMENT NOTE
Outpatient Physical Therapy Ortho Treatment Note   Ida Jo     Patient Name: Surjit Stevenson  : 1960  MRN: [id number]  Today's Date: 2024      Visit Date: 2024    Visit Dx:    ICD-10-CM ICD-9-CM   1. Status post total knee replacement, left  Z96.652 V43.65       Patient Active Problem List   Diagnosis    Primary osteoarthritis of left knee    Moderate episode of recurrent major depressive disorder    OA (osteoarthritis) of knee    Effusion of left knee    Iron deficiency anemia secondary to inadequate dietary iron intake    Post-traumatic osteoarthritis of left knee        Past Medical History:   Diagnosis Date    Arthritis         Past Surgical History:   Procedure Laterality Date    HERNIA REPAIR      KNEE SURGERY Bilateral     1980s    TOTAL KNEE ARTHROPLASTY Left 2024    Procedure: TOTAL KNEE ARTHROPLASTY WITH CORI ROBOT;  Surgeon: Iain Dowling MD;  Location: [facility];  Service: Robotics - Ortho;  Laterality: Left;        PT Ortho       Row Name 24 0800       Left Lower Ext    Lt Knee Extension/Flexion AROM 0-102 degrees post stretch  -AS              User Key  (r) = Recorded By, (t) = Taken By, (c) = Cosigned By      Initials Name Provider Type    AS Pierce Alvarado, PT Physical Therapist                                 PT Assessment/Plan       Row Name 24 0800          PT Assessment    Assessment Comments Patient presents to outpatient PT without the use of an AD. Patient's left knee flexion continues to be limited but is showing progress after stretching. Progressed patient with ROM and strengthening exercises today and he tolerated this well. Plan to continue to progress patient as tolerated.  -AS        PT Plan    PT Plan Comments Continue with current treatment plan.  -AS               User Key  (r) = Recorded By, (t) = Taken By, (c) = Cosigned By      Initials Name Provider Type    AS Pierce Alvarado, PT Physical Therapist                       OP  Exercises       Row Name 01/12/24 0941 01/12/24 0800          Subjective    Subjective Comments -- Patient states his knee is doing better but is very stiff.  -AS        Total Minutes    81992 - PT Therapeutic Exercise Minutes 75  -AS --        Exercise 1    Exercise Name 1 -- Heel Slides  -AS     Time 1 -- 10 min  -AS        Exercise 2    Exercise Name 2 -- Wall Slides  -AS     Time 2 -- 10 min  -AS        Exercise 3    Exercise Name 3 -- Bike - Seat 1  -AS     Time 3 -- 5 min  -AS        Exercise 4    Exercise Name 4 -- Manual Flexion  -AS     Reps 4 -- 10  -AS     Time 4 -- 10 sec hold each  -AS        Exercise 5    Exercise Name 5 -- HS Stretch  -AS     Reps 5 -- 10  -AS     Time 5 -- 10 sec hold each  -AS        Exercise 6    Exercise Name 6 -- Heel Prop  -AS     Time 6 -- 5 min  -AS        Exercise 7    Exercise Name 7 -- Manual Extension  -AS     Reps 7 -- 10  -AS     Time 7 -- 10 sec hold each  -AS        Exercise 8    Exercise Name 8 -- QS  -AS     Reps 8 -- 25  -AS     Time 8 -- 5 sec hold each  -AS        Exercise 9    Exercise Name 9 -- 3-Way Hip  -AS     Reps 9 -- 25 each  -AS        Exercise 10    Exercise Name 10 -- LAQ  -AS     Reps 10 -- 25  -AS        Exercise 11    Exercise Name 11 -- TKE  -AS     Reps 11 -- 25  -AS     Time 11 -- Gold  -AS        Exercise 12    Exercise Name 12 -- Heel Raises  -AS     Reps 12 -- 25  -AS        Exercise 13    Exercise Name 13 -- Mini Squats  -AS     Reps 13 -- 25  -AS        Exercise 14    Exercise Name 14 -- FWD Step Ups  -AS        Exercise 15    Exercise Name 15 -- Lateral Step Overs  -AS               User Key  (r) = Recorded By, (t) = Taken By, (c) = Cosigned By      Initials Name Provider Type    AS Pierce Alvarado, PT Physical Therapist                                                    Time Calculation:   Start Time: 0823  Stop Time: 0941  Time Calculation (min): 78 min  Timed Charges  43939 - PT Therapeutic Exercise Minutes: 75  Total Minutes  Timed  Charges Total Minutes: 75   Total Minutes: 75  Therapy Charges for Today       Code Description Service Date Service Provider Modifiers Qty    [id number]  PT THER PROC EA 15 MIN 1/12/2024 Pierce Alvarado, PT GP 3                      Pierce Alvarado, PT  1/12/2024

## 2024-01-13 LAB — BACTERIA SPEC ANAEROBE CULT: NORMAL

## 2024-01-14 LAB
BACTERIA SPEC AEROBE CULT: NORMAL
GRAM STN SPEC: NORMAL
GRAM STN SPEC: NORMAL

## 2024-01-15 ENCOUNTER — OFFICE VISIT (OUTPATIENT)
Dept: INTERNAL MEDICINE | Facility: CLINIC | Age: 64
End: 2024-01-15
Payer: COMMERCIAL

## 2024-01-15 VITALS
HEART RATE: 66 BPM | WEIGHT: 200.6 LBS | BODY MASS INDEX: 29.71 KG/M2 | SYSTOLIC BLOOD PRESSURE: 118 MMHG | OXYGEN SATURATION: 97 % | DIASTOLIC BLOOD PRESSURE: 66 MMHG | HEIGHT: 69 IN | TEMPERATURE: 98.7 F

## 2024-01-15 DIAGNOSIS — M17.12 PRIMARY OSTEOARTHRITIS OF LEFT KNEE: Primary | ICD-10-CM

## 2024-01-15 DIAGNOSIS — R03.0 ELEVATED BLOOD PRESSURE READING: ICD-10-CM

## 2024-01-15 NOTE — PATIENT INSTRUCTIONS
[email or web address]  [address], [place], [place] [address] [phone]

## 2024-01-15 NOTE — PROGRESS NOTES
"Surjit Stevenson is a 63 y.o. male who presents with a chief complaint of   Chief Complaint   Patient presents with    Hospital Follow Up Visit     From knee surgery        HPI     Doing well and following PT.  Post op labs reviewed and looked okay.  Will re-check in May as previously planned.  Finishing up ASA for ppx.         The following portions of the patient's history were reviewed and updated as appropriate: allergies, current medications, past family history, past medical history, past social history, past surgical history and problem list.      Current Outpatient Medications:     oxyCODONE-acetaminophen (PERCOCET) 5-325 MG per tablet, Take 1 tablet by mouth Every 4 (Four) Hours As Needed for Severe Pain. (Patient taking differently: Take 1 tablet by mouth Every 4 (Four) Hours As Needed for Severe Pain. Only as needed), Disp: 45 tablet, Rfl: 0    aspirin 81 MG EC tablet, Take 1 tablet by mouth Every 12 (Twelve) Hours. Indications: VTE Prophylaxis, Disp: 60 tablet, Rfl: 0    celecoxib (CeleBREX) 100 MG capsule, Take 1 capsule by mouth 2 (Two) Times a Day As Needed for Moderate Pain. (Patient not taking: Reported on 1/15/2024), Disp: 180 capsule, Rfl: 0            Physical Exam  /66 (BP Location: Right arm, Patient Position: Sitting, Cuff Size: Adult)   Pulse 66   Temp 98.7 °F (37.1 °C) (Infrared)   Ht 175.3 cm (69\")   Wt 91 kg (200 lb 9.6 oz)   SpO2 97%   BMI 29.62 kg/m²     Physical Exam  Vitals reviewed.   Constitutional:       General: He is not in acute distress.     Appearance: Normal appearance.   HENT:      Head: Normocephalic and atraumatic.      Nose: Nose normal.      Mouth/Throat:      Mouth: Mucous membranes are moist.   Eyes:      Conjunctiva/sclera: Conjunctivae normal.   Pulmonary:      Effort: Pulmonary effort is normal.   Skin:     General: Skin is warm and dry.   Neurological:      General: No focal deficit present.      Mental Status: He is alert.   Psychiatric:         Mood " and Affect: Mood normal.           Results for orders placed or performed during the hospital encounter of 01/08/24   Anaerobic Culture - Synovium, Knee, Left    Specimen: Knee, Left; Synovium   Result Value Ref Range    Anaerobic Culture No anaerobes isolated at 5 days    Tissue / Bone Culture - Tissue, Knee, Left    Specimen: Knee, Left; Tissue   Result Value Ref Range    Tissue Culture No growth at 3 days     Gram Stain Rare (1+) WBCs seen     Gram Stain No organisms seen    Basic Metabolic Panel    Specimen: Blood   Result Value Ref Range    Glucose 156 (H) 65 - 99 mg/dL    BUN 18 8 - 23 mg/dL    Creatinine 1.08 0.76 - 1.27 mg/dL    Sodium 135 (L) 136 - 145 mmol/L    Potassium 4.3 3.5 - 5.2 mmol/L    Chloride 103 98 - 107 mmol/L    CO2 26.4 22.0 - 29.0 mmol/L    Calcium 8.4 (L) 8.6 - 10.5 mg/dL    BUN/Creatinine Ratio 16.7 7.0 - 25.0    Anion Gap 5.6 5.0 - 15.0 mmol/L    eGFR 78.1 >60.0 mL/min/1.73   CBC Auto Differential    Specimen: Blood   Result Value Ref Range    WBC 9.91 3.40 - 10.80 10*3/mm3    RBC 4.16 4.14 - 5.80 10*6/mm3    Hemoglobin 10.2 (L) 13.0 - 17.7 g/dL    Hematocrit 34.4 (L) 37.5 - 51.0 %    MCV 82.7 79.0 - 97.0 fL    MCH 24.5 (L) 26.6 - 33.0 pg    MCHC 29.7 (L) 31.5 - 35.7 g/dL    RDW 14.9 12.3 - 15.4 %    RDW-SD 44.8 37.0 - 54.0 fl    MPV 11.4 6.0 - 12.0 fL    Platelets 230 140 - 450 10*3/mm3    Neutrophil % 86.0 (H) 42.7 - 76.0 %    Lymphocyte % 4.6 (L) 19.6 - 45.3 %    Monocyte % 8.9 5.0 - 12.0 %    Eosinophil % 0.0 (L) 0.3 - 6.2 %    Basophil % 0.2 0.0 - 1.5 %    Immature Grans % 0.3 0.0 - 0.5 %    Neutrophils, Absolute 8.52 (H) 1.70 - 7.00 10*3/mm3    Lymphocytes, Absolute 0.46 (L) 0.70 - 3.10 10*3/mm3    Monocytes, Absolute 0.88 0.10 - 0.90 10*3/mm3    Eosinophils, Absolute 0.00 0.00 - 0.40 10*3/mm3    Basophils, Absolute 0.02 0.00 - 0.20 10*3/mm3    Immature Grans, Absolute 0.03 0.00 - 0.05 10*3/mm3    nRBC 0.0 0.0 - 0.2 /100 WBC   Tissue Pathology Exam    Specimen: A: Knee, Left;  "Synovium    B: Knee, Left; Synovium   Result Value Ref Range    Case Report       Surgical Pathology Report                         Case: [id number]                                  Authorizing Provider:  Iain Dowling MD      Collected:           01/08/2024 10:47 AM          Ordering Location:     [facility]   Received:            01/08/2024 12:38 PM                                 OR                                                                           Pathologist:           Марина Coel MD                                                    Specimens:   1) - Knee, Left, synovium #1                                                                        2) - Knee, Left, synovium #2                                                               Final Diagnosis       1.  Left knee, synovium #1, excision:   -Fibrin and acute inflammation only.    -No viable intact synovium present.    2.  Left knee, synovium #2, excision:  -Reactive fibrous synovial tissue with chronic active inflammation (up to 25 neutrophils in a high-power field) (see Comment).    SWM      Comment       The majority of the inflammation within the synovium is chronic inflammation with associated hemosiderin, consistent with degenerative arthritic disease. There are foci of increased neutrophils which are predominantly surface and in vessels (not diffuse throughout the synovium). Recommend close clinical follow up. This case is reviewed internally with Dr. Peres, who concurs.      Gross Description       1. Knee, Left.  Received fresh labeled \"left knee synovium #1\" is a 5.5 x 3.5 x 3.0 cm aggregate of mucinous yellow-white friable tissue, which is serially sectioned and representative sections are submitted as 1A-1C.    jap/uso/nicolasa    2. Knee, Left.  Received fresh labeled \"left knee synovium #2\" is a 5.5 x 5.5 x 3.5 cm aggregate of fibrofatty tissue and tan rubbery tissue.  There are no obvious areas of hemorrhage or " necrosis.  Representative sections are submitted as 2A-2C.    jap/uso/swm               Diagnoses and all orders for this visit:    1. Primary osteoarthritis of left knee (Primary)    2. Elevated blood pressure reading      Doing well post op.  Reviewed d/c summary, labs.  BP looks good today.  Encouraged to continue great work in PT.

## 2024-01-16 ENCOUNTER — HOSPITAL ENCOUNTER (OUTPATIENT)
Dept: PHYSICAL THERAPY | Facility: HOSPITAL | Age: 64
Setting detail: THERAPIES SERIES
Discharge: HOME | End: 2024-01-16
Payer: COMMERCIAL

## 2024-01-16 DIAGNOSIS — Z96.652 STATUS POST TOTAL KNEE REPLACEMENT, LEFT: Primary | ICD-10-CM

## 2024-01-16 PROCEDURE — 97110 THERAPEUTIC EXERCISES: CPT

## 2024-01-16 NOTE — THERAPY TREATMENT NOTE
Outpatient Physical Therapy Ortho Treatment Note   [place]     Patient Name: Surjit Stevenson  : 1960  MRN: [id number]  Today's Date: 2024      Visit Date: 2024    Visit Dx:    ICD-10-CM ICD-9-CM   1. Status post total knee replacement, left  Z96.652 V43.65       Patient Active Problem List   Diagnosis    Primary osteoarthritis of left knee    Moderate episode of recurrent major depressive disorder    OA (osteoarthritis) of knee    Effusion of left knee    Iron deficiency anemia secondary to inadequate dietary iron intake    Post-traumatic osteoarthritis of left knee        Past Medical History:   Diagnosis Date    Arthritis         Past Surgical History:   Procedure Laterality Date    HERNIA REPAIR      KNEE SURGERY Bilateral     1980s    TOTAL KNEE ARTHROPLASTY Left 2024    Procedure: TOTAL KNEE ARTHROPLASTY WITH CORI ROBOT;  Surgeon: Iain Dowling MD;  Location: [facility];  Service: Robotics - Ortho;  Laterality: Left;        PT Ortho       Row Name 24 1100       Left Lower Ext    Lt Knee Extension/Flexion AROM 0-102 degrees post stretch  -AS              User Key  (r) = Recorded By, (t) = Taken By, (c) = Cosigned By      Initials Name Provider Type    AS Pierce Alvarado, PT Physical Therapist                                 PT Assessment/Plan       Row Name 24 1100          PT Assessment    Assessment Comments Patient continues to improve with PT at this time. Plan to continue to progress patient as tolerated.  -AS        PT Plan    PT Plan Comments Continue with current treatment plan.  -AS               User Key  (r) = Recorded By, (t) = Taken By, (c) = Cosigned By      Initials Name Provider Type    AS Pierce Alvarado, PT Physical Therapist                       OP Exercises       Row Name 24 1317 24 1100          Subjective    Subjective Comments -- Patient states his knee is doing better.  -AS        Total Minutes    77172 - PT Therapeutic  "Exercise Minutes 60  -AS --        Exercise 1    Exercise Name 1 -- Heel Slides  -AS     Time 1 -- 10 min  -AS        Exercise 2    Exercise Name 2 -- Wall Slides  -AS     Time 2 -- 10 min  -AS        Exercise 3    Exercise Name 3 -- Bike - Seat 1  -AS     Time 3 -- 5 min  -AS        Exercise 4    Exercise Name 4 -- Manual Flexion  -AS     Reps 4 -- 10  -AS     Time 4 -- 10 sec hold each  -AS        Exercise 5    Exercise Name 5 -- HS Stretch  -AS     Reps 5 -- 10  -AS     Time 5 -- 10 sec hold each  -AS        Exercise 6    Exercise Name 6 -- Heel Prop  -AS     Time 6 -- 5 min  -AS        Exercise 7    Exercise Name 7 -- Manual Extension  -AS     Reps 7 -- 10  -AS     Time 7 -- 10 sec hold each  -AS        Exercise 8    Exercise Name 8 -- QS  -AS     Reps 8 -- 25  -AS     Time 8 -- 5 sec hold each  -AS        Exercise 9    Exercise Name 9 -- 3-Way Hip  -AS     Reps 9 -- 25 each  -AS        Exercise 10    Exercise Name 10 -- LAQ  -AS     Reps 10 -- 25  -AS        Exercise 11    Exercise Name 11 -- TKE  -AS     Reps 11 -- 25  -AS     Time 11 -- Gold  -AS        Exercise 12    Exercise Name 12 -- Heel Raises  -AS     Reps 12 -- 25  -AS        Exercise 13    Exercise Name 13 -- Mini Squats  -AS     Reps 13 -- 25  -AS        Exercise 14    Exercise Name 14 -- 6\" FWD Step Ups  -AS     Reps 14 -- --  -AS        Exercise 15    Exercise Name 15 -- Lateral Step Overs  -AS               User Key  (r) = Recorded By, (t) = Taken By, (c) = Cosigned By      Initials Name Provider Type    AS Pierce Alvarado PT Physical Therapist                                                    Time Calculation:   Start Time: 1148  Stop Time: 1250  Time Calculation (min): 62 min  Timed Charges  59199 - PT Therapeutic Exercise Minutes: 60  Total Minutes  Timed Charges Total Minutes: 60   Total Minutes: 60  Therapy Charges for Today       Code Description Service Date Service Provider Modifiers Qty    [id number]  PT THER PROC EA 15 MIN " 1/16/2024 Pierce Alvarado, PT GP 2                      Pierce Alvarado, PT  1/16/2024

## 2024-01-19 ENCOUNTER — APPOINTMENT (OUTPATIENT)
Dept: PHYSICAL THERAPY | Facility: HOSPITAL | Age: 64
End: 2024-01-19
Payer: COMMERCIAL

## 2024-01-23 ENCOUNTER — HOSPITAL ENCOUNTER (OUTPATIENT)
Dept: PHYSICAL THERAPY | Facility: HOSPITAL | Age: 64
Setting detail: THERAPIES SERIES
Discharge: HOME | End: 2024-01-23
Payer: COMMERCIAL

## 2024-01-23 DIAGNOSIS — Z96.652 STATUS POST TOTAL KNEE REPLACEMENT, LEFT: Primary | ICD-10-CM

## 2024-01-23 PROCEDURE — 97110 THERAPEUTIC EXERCISES: CPT

## 2024-01-23 NOTE — THERAPY TREATMENT NOTE
Outpatient Physical Therapy Ortho Treatment Note   [place]     Patient Name: Surjit Stevenson  : 1960  MRN: [id number]  Today's Date: 2024      Visit Date: 2024    Visit Dx:    ICD-10-CM ICD-9-CM   1. Status post total knee replacement, left  Z96.652 V43.65       Patient Active Problem List   Diagnosis    Primary osteoarthritis of left knee    Moderate episode of recurrent major depressive disorder    OA (osteoarthritis) of knee    Effusion of left knee    Iron deficiency anemia secondary to inadequate dietary iron intake    Post-traumatic osteoarthritis of left knee        Past Medical History:   Diagnosis Date    Arthritis         Past Surgical History:   Procedure Laterality Date    HERNIA REPAIR      KNEE SURGERY Bilateral     1980s    TOTAL KNEE ARTHROPLASTY Left 2024    Procedure: TOTAL KNEE ARTHROPLASTY WITH CORI ROBOT;  Surgeon: Iain Dowling MD;  Location: [facility];  Service: Robotics - Ortho;  Laterality: Left;        PT Ortho       Row Name 24 0800       Left Lower Ext    Lt Knee Extension/Flexion AROM 0-115 degrees post stretch  -AS              User Key  (r) = Recorded By, (t) = Taken By, (c) = Cosigned By      Initials Name Provider Type    AS Pierce Alvarado, PT Physical Therapist                                 PT Assessment/Plan       Row Name 24 0800          PT Assessment    Assessment Comments Patient continues to improve with PT at this time. Plan to continue to progress patient as tolerated.  -AS        PT Plan    PT Plan Comments Continue with current treatment plan.  -AS               User Key  (r) = Recorded By, (t) = Taken By, (c) = Cosigned By      Initials Name Provider Type    AS Pierce Alvarado, PT Physical Therapist                       OP Exercises       Row Name 24 0929 24 0800          Subjective    Subjective Comments -- Patient states he is feeling better and no longer sick. He states his knee is also doing better.   "-AS        Total Minutes    85322 - PT Therapeutic Exercise Minutes 60  -AS --        Exercise 1    Exercise Name 1 -- Heel Slides  -AS     Time 1 -- 8 min  -AS        Exercise 2    Exercise Name 2 -- Wall Slides  -AS     Time 2 -- 8 min  -AS        Exercise 3    Exercise Name 3 -- Bike - Seat 1  -AS     Time 3 -- 5 min  -AS        Exercise 4    Exercise Name 4 -- Manual Flexion  -AS     Reps 4 -- 10  -AS     Time 4 -- 10 sec hold each  -AS        Exercise 5    Exercise Name 5 -- HS Stretch  -AS     Reps 5 -- 10  -AS     Time 5 -- 10 sec hold each  -AS        Exercise 6    Exercise Name 6 -- Heel Prop  -AS     Time 6 -- 5 min - 1#  -AS        Exercise 7    Exercise Name 7 -- Manual Extension  -AS     Reps 7 -- 10  -AS     Time 7 -- 10 sec hold each  -AS        Exercise 8    Exercise Name 8 -- QS  -AS     Reps 8 -- 25  -AS     Time 8 -- 5 sec hold each  -AS        Exercise 9    Exercise Name 9 -- 3-Way Hip  -AS     Reps 9 -- 25 each  -AS        Exercise 10    Exercise Name 10 -- LAQ  -AS     Reps 10 -- 25  -AS        Exercise 11    Exercise Name 11 -- TKE  -AS     Reps 11 -- 25  -AS     Time 11 -- Gold  -AS        Exercise 12    Exercise Name 12 -- Heel Raises  -AS     Reps 12 -- 25  -AS        Exercise 13    Exercise Name 13 -- Mini Squats  -AS     Reps 13 -- 25  -AS        Exercise 14    Exercise Name 14 -- 6\" FWD Step Ups  -AS     Reps 14 -- 15 each leg  -AS        Exercise 15    Exercise Name 15 -- Lateral Step Overs  -AS               User Key  (r) = Recorded By, (t) = Taken By, (c) = Cosigned By      Initials Name Provider Type    AS Pierce Alvarado, PT Physical Therapist                                                    Time Calculation:   Start Time: 0821  Stop Time: 0927  Time Calculation (min): 66 min  Timed Charges  46522 - PT Therapeutic Exercise Minutes: 60  Total Minutes  Timed Charges Total Minutes: 60   Total Minutes: 60  Therapy Charges for Today       Code Description Service Date Service " Provider Modifiers Qty    [id number]  PT THER PROC EA 15 MIN 1/23/2024 Pierce Alvarado, PT GP 3                      Pierce Alvarado, PT  1/23/2024

## 2024-01-25 ENCOUNTER — APPOINTMENT (OUTPATIENT)
Dept: PHYSICAL THERAPY | Facility: HOSPITAL | Age: 64
End: 2024-01-25
Payer: COMMERCIAL

## 2024-01-26 ENCOUNTER — TELEPHONE (OUTPATIENT)
Dept: ORTHOPEDIC SURGERY | Facility: CLINIC | Age: 64
End: 2024-01-26

## 2024-01-26 NOTE — TELEPHONE ENCOUNTER
Caller: Surjit Stevenson    Relationship to patient: Self    Best call back number: [phone] (home)     Patient is needing: PATIENT IS NOT FEELING WELL AND NEEDED TO R/S HIS 1ST POST OP WITH [facility]. SX WAS 1/8/24. HE HAD MOVED THE APPT BACK ALREADY FOR SICKNESS. IT IS NOW R/S FOR 1/30/24 WHICH IS MORE THAN 72 HOURS PAST HIS ORIGINAL POST OP DATE. PLEASE ADVISE PATIENT IF HE NEEDS TO BE SEEN SOONER

## 2024-01-26 NOTE — TELEPHONE ENCOUNTER
Patient advised his knee is doing very well, he currently had flu like symptoms, body aches/ fever.    Did encourage patient to reach out to his PCP.    Thanks.

## 2024-01-30 ENCOUNTER — OFFICE VISIT (OUTPATIENT)
Dept: ORTHOPEDIC SURGERY | Facility: CLINIC | Age: 64
End: 2024-01-30
Payer: COMMERCIAL

## 2024-01-30 ENCOUNTER — HOSPITAL ENCOUNTER (OUTPATIENT)
Dept: PHYSICAL THERAPY | Facility: HOSPITAL | Age: 64
Setting detail: THERAPIES SERIES
Discharge: HOME | End: 2024-01-30
Payer: COMMERCIAL

## 2024-01-30 VITALS — HEIGHT: 69 IN | WEIGHT: 200 LBS | BODY MASS INDEX: 29.62 KG/M2

## 2024-01-30 DIAGNOSIS — Z96.652 STATUS POST LEFT KNEE REPLACEMENT: Primary | ICD-10-CM

## 2024-01-30 DIAGNOSIS — Z96.652 STATUS POST TOTAL KNEE REPLACEMENT, LEFT: Primary | ICD-10-CM

## 2024-01-30 PROCEDURE — 73560 X-RAY EXAM OF KNEE 1 OR 2: CPT | Performed by: INTERNAL MEDICINE

## 2024-01-30 PROCEDURE — 99024 POSTOP FOLLOW-UP VISIT: CPT | Performed by: INTERNAL MEDICINE

## 2024-01-30 PROCEDURE — 97110 THERAPEUTIC EXERCISES: CPT

## 2024-01-30 NOTE — PROGRESS NOTES
"Subjective:     Patient ID: Surjit Stevenson is a 63 y.o. male.    Chief Complaint:    History of Present Illness  Surjit Stevenson returns to clinic today for evaluation of left knee status post total knee arthroplasty 3 weeks ago.  The patient's postoperative recovery was complicated by the flu.  He states that his knee is doing tremendously well and he is thrilled with how the incision looks and how much more stable his knee is.  He states that he is hardly been taking any pain medication.  He says that intermittently he gets a twinge of pain along the medial aspect of his femur but otherwise is doing quite well.     Social History     Occupational History    Not on file   Tobacco Use    Smoking status: Former     Packs/day: 1.00     Years: 10.00     Additional pack years: 0.00     Total pack years: 10.00     Types: Cigarettes     Quit date:      Years since quittin.1     Passive exposure: Past    Smokeless tobacco: Never    Tobacco comments:     quit 30 yrs ago; was 1 ppd for 11 yrs   Vaping Use    Vaping Use: Never used   Substance and Sexual Activity    Alcohol use: Yes     Comment: not currently, previously abused alcohol    Drug use: No    Sexual activity: Defer      Past Medical History:   Diagnosis Date    Arthritis      Past Surgical History:   Procedure Laterality Date    HERNIA REPAIR      KNEE SURGERY Bilateral         TOTAL KNEE ARTHROPLASTY Left 2024    Procedure: TOTAL KNEE ARTHROPLASTY WITH CORI ROBOT;  Surgeon: Iain Dowling MD;  Location: [facility];  Service: Robotics - Ortho;  Laterality: Left;       Family History   Problem Relation Age of Onset    Hypertension Mother     Prostate cancer Father     Kidney cancer Father     Malig Hyperthermia Neg Hx                  Objective:  Vitals:    24   Weight: 90.7 kg (200 lb)   Height: 175.3 cm (69\")         24   Weight: 90.7 kg (200 lb)     Body mass index is 29.53 kg/m².        Left Knee Exam     Tenderness   The " patient is experiencing tenderness in the medial retinaculum and medial joint line.    Range of Motion   Extension:  0   Flexion:  110     Tests   Varus: negative Valgus: negative  Lachman:  Anterior - negative    Posterior - negative  Drawer:  Anterior - negative     Posterior - negative    Other   Erythema: absent  Scars: present  Sensation: normal  Pulse: present  Swelling: moderate  Effusion: effusion present                 Imagin views of the left knee were ordered and reviewed by myself in the office today  Indication: left knee replacement  Findings: X-rays demonstrate a cemented left total knee arthroplasty with an unresurfaced patella with implants in expected position no signs of loosening fracture, dislocation, subluxation, wear or subsidence.  No new acute osseous abnormality.  Comparative studies: Immediate postop radiographs      Assessment:        1. Status post left knee replacement           Plan:          Discussed treatment options at length with patient at today's visit.  Discussed with the patient that I would like him to push hard in physical therapy.  I think he still has some work to do with motion particularly for flexion and full extension.  I am happy to hear that he is doing so well.  I like him to continue to take the aspirin for total of 4 weeks.  I will plan to see the patient back in 4 weeks for 6-week checkup.  I discussed with him if he notes any fevers chills redness or drainage from surgical incision of any kind please call the office immediately.  He should continue with PT.  I discussed with him to call the office if he needs medication refill but it sounds like he has already weaned himself off of the narcotics.  He should remain off work in the meantime  Follow up: 4 weeks without x-rays      Surjit Stevenson was in agreement with plan and had all questions answered.     Medications:  No orders of the defined types were placed in this encounter.      Followup:  No follow-ups  on file.    Diagnoses and all orders for this visit:    1. Status post left knee replacement (Primary)  -     XR Knee 1 or 2 View Left          Dictated utilizing Dragon dictation

## 2024-01-30 NOTE — THERAPY TREATMENT NOTE
Outpatient Physical Therapy Ortho Treatment Note   [place]     Patient Name: Surjit Stevenson  : 1960  MRN: [id number]  Today's Date: 2024      Visit Date: 2024    Visit Dx:    ICD-10-CM ICD-9-CM   1. Status post total knee replacement, left  Z96.652 V43.65       Patient Active Problem List   Diagnosis    Primary osteoarthritis of left knee    Moderate episode of recurrent major depressive disorder    OA (osteoarthritis) of knee    Effusion of left knee    Iron deficiency anemia secondary to inadequate dietary iron intake    Post-traumatic osteoarthritis of left knee        Past Medical History:   Diagnosis Date    Arthritis         Past Surgical History:   Procedure Laterality Date    HERNIA REPAIR      KNEE SURGERY Bilateral     1980s    TOTAL KNEE ARTHROPLASTY Left 2024    Procedure: TOTAL KNEE ARTHROPLASTY WITH CORI ROBOT;  Surgeon: Iain Dowling MD;  Location: [facility];  Service: Robotics - Ortho;  Laterality: Left;        PT Ortho       Row Name 24 0800       Left Lower Ext    Lt Knee Extension/Flexion AROM 0-124 degrees post stretch  -AS              User Key  (r) = Recorded By, (t) = Taken By, (c) = Cosigned By      Initials Name Provider Type    AS Pierce Alvarado, PT Physical Therapist                                 PT Assessment/Plan       Row Name 24 0800          PT Assessment    Assessment Comments Patient continues to do well with PT. WBing exercises were held today due to patient not feeling well.  -AS        PT Plan    PT Plan Comments Continue with current treatment plan.  -AS               User Key  (r) = Recorded By, (t) = Taken By, (c) = Cosigned By      Initials Name Provider Type    AS Pierce Alvarado, PT Physical Therapist                       OP Exercises       Row Name 24 0920 24 0800          Subjective    Subjective Comments -- Patient states his knee is doing well. He states his Ortho is pleased with his progress at this  "time.  -AS        Total Minutes    48749 - PT Therapeutic Exercise Minutes 40  -AS --        Exercise 1    Exercise Name 1 -- Heel Slides  -AS     Time 1 -- 5 min  -AS        Exercise 2    Exercise Name 2 -- Wall Slides  -AS     Time 2 -- 5 min  -AS        Exercise 3    Exercise Name 3 -- Bike - Seat 1  -AS     Time 3 -- 5 min  -AS        Exercise 4    Exercise Name 4 -- Manual Flexion  -AS     Reps 4 -- --  -AS     Time 4 -- --  -AS        Exercise 5    Exercise Name 5 -- HS Stretch  -AS     Reps 5 -- 10  -AS     Time 5 -- 10 sec hold each  -AS        Exercise 6    Exercise Name 6 -- Heel Prop  -AS     Time 6 -- 5 min - 2#  -AS        Exercise 7    Exercise Name 7 -- Manual Extension  -AS     Reps 7 -- --  -AS     Time 7 -- --  -AS        Exercise 8    Exercise Name 8 -- QS  -AS     Reps 8 -- 25  -AS     Time 8 -- 5 sec hold each  -AS        Exercise 9    Exercise Name 9 -- 3-Way Hip  -AS     Reps 9 -- 25 each  -AS     Time 9 -- 1#  -AS        Exercise 10    Exercise Name 10 -- LAQ  -AS     Reps 10 -- 25  -AS     Time 10 -- 1#  -AS        Exercise 11    Exercise Name 11 -- TKE  -AS     Reps 11 -- --  -AS     Time 11 -- --  -AS        Exercise 12    Exercise Name 12 -- Heel Raises  -AS     Reps 12 -- --  -AS        Exercise 13    Exercise Name 13 -- Mini Squats  -AS     Reps 13 -- --  -AS        Exercise 14    Exercise Name 14 -- 6\" FWD Step Ups  -AS     Reps 14 -- --  -AS        Exercise 15    Exercise Name 15 -- 6\" Lateral Step Overs  -AS     Reps 15 -- --  -AS               User Key  (r) = Recorded By, (t) = Taken By, (c) = Cosigned By      Initials Name Provider Type    AS Pierce Alvarado, PT Physical Therapist                                                    Time Calculation:   Start Time: 0836  Stop Time: 0920  Time Calculation (min): 44 min  Timed Charges  45954 - PT Therapeutic Exercise Minutes: 40  Total Minutes  Timed Charges Total Minutes: 40   Total Minutes: 40  Therapy Charges for Today       " Code Description Service Date Service Provider Modifiers Qty    21884856149 HC PT THER PROC EA 15 MIN 1/30/2024 Pierce Alvarado, PT GP 2                      Pierce Alvarado, PT  1/30/2024

## 2024-02-02 ENCOUNTER — HOSPITAL ENCOUNTER (OUTPATIENT)
Dept: PHYSICAL THERAPY | Facility: HOSPITAL | Age: 64
Setting detail: THERAPIES SERIES
Discharge: HOME | End: 2024-02-02
Payer: COMMERCIAL

## 2024-02-02 DIAGNOSIS — Z96.652 STATUS POST TOTAL KNEE REPLACEMENT, LEFT: Primary | ICD-10-CM

## 2024-02-02 PROCEDURE — 97110 THERAPEUTIC EXERCISES: CPT

## 2024-02-02 NOTE — THERAPY TREATMENT NOTE
Outpatient Physical Therapy Ortho Treatment Note   [place]     Patient Name: Surjit Stevenson  : 1960  MRN: [id number]  Today's Date: 2024      Visit Date: 2024    Visit Dx:    ICD-10-CM ICD-9-CM   1. Status post total knee replacement, left  Z96.652 V43.65       Patient Active Problem List   Diagnosis    Primary osteoarthritis of left knee    Moderate episode of recurrent major depressive disorder    OA (osteoarthritis) of knee    Effusion of left knee    Iron deficiency anemia secondary to inadequate dietary iron intake    Post-traumatic osteoarthritis of left knee        Past Medical History:   Diagnosis Date    Arthritis         Past Surgical History:   Procedure Laterality Date    HERNIA REPAIR      KNEE SURGERY Bilateral     1980s    TOTAL KNEE ARTHROPLASTY Left 2024    Procedure: TOTAL KNEE ARTHROPLASTY WITH CORI ROBOT;  Surgeon: Iain Dowling MD;  Location: [facility];  Service: Robotics - Ortho;  Laterality: Left;        PT Ortho       Row Name 24 0800       Left Lower Ext    Lt Knee Extension/Flexion AROM 0-124 degrees  -AS    Lt Knee Extension/Flexion PROM 0-127 degrees  -AS              User Key  (r) = Recorded By, (t) = Taken By, (c) = Cosigned By      Initials Name Provider Type    AS Pierce Alvarado, PT Physical Therapist                                 PT Assessment/Plan       Row Name 24 0800          PT Assessment    Assessment Comments Patient continues to do well with PT. Plan to continue to progress patient with exercises as tolerated.  -AS        PT Plan    PT Plan Comments Continue with current treatment plan.  -AS               User Key  (r) = Recorded By, (t) = Taken By, (c) = Cosigned By      Initials Name Provider Type    AS Pierce Alavrado, PT Physical Therapist                       OP Exercises       Row Name 24 0931 24 0800          Subjective    Subjective Comments -- Patient states his knee is doing well. He states his  "pain and better and \"it feels strong.\" He does report having a \"click\" when he walks.  -AS        Total Minutes    70538 - PT Therapeutic Exercise Minutes 55  -AS --        Exercise 1    Exercise Name 1 -- Heel Slides  -AS     Time 1 -- 5 min  -AS        Exercise 2    Exercise Name 2 -- Wall Slides  -AS     Time 2 -- 5 min  -AS        Exercise 3    Exercise Name 3 -- Bike - Seat 1  -AS     Time 3 -- 5 min  -AS        Exercise 4    Exercise Name 4 -- Manual Flexion  -AS     Reps 4 -- 10  -AS     Time 4 -- 10 sec hold each  -AS        Exercise 5    Exercise Name 5 -- HS Stretch  -AS     Reps 5 -- 10  -AS     Time 5 -- 10 sec hold each  -AS        Exercise 6    Exercise Name 6 -- Heel Prop  -AS     Time 6 -- 5 min - 3#  -AS        Exercise 7    Exercise Name 7 -- Manual Extension  -AS     Reps 7 -- 10  -AS     Time 7 -- 10 sec hold each  -AS        Exercise 8    Exercise Name 8 -- QS  -AS     Reps 8 -- 25  -AS     Time 8 -- 5 sec hold each  -AS        Exercise 9    Exercise Name 9 -- 3-Way Hip  -AS     Reps 9 -- 25 each  -AS     Time 9 -- 1#  -AS        Exercise 10    Exercise Name 10 -- LAQ  -AS     Reps 10 -- 25  -AS     Time 10 -- 1#  -AS        Exercise 11    Exercise Name 11 -- TKE  -AS     Reps 11 -- 25  -AS     Time 11 -- Gold  -AS        Exercise 12    Exercise Name 12 -- Heel Raises  -AS     Reps 12 -- 25  -AS        Exercise 13    Exercise Name 13 -- Mini Squats  -AS     Reps 13 -- 25  -AS        Exercise 14    Exercise Name 14 -- 6\" FWD Step Ups  -AS     Reps 14 -- 25 each leg  -AS        Exercise 15    Exercise Name 15 -- 6\" Lateral Step Overs  -AS     Reps 15 -- 25  -AS               User Key  (r) = Recorded By, (t) = Taken By, (c) = Cosigned By      Initials Name Provider Type    AS Pierce Alvarado, PT Physical Therapist                                                    Time Calculation:   Start Time: 0821  Stop Time: 0919  Time Calculation (min): 58 min  Timed Charges  49731 - PT Therapeutic " Exercise Minutes: 55  Total Minutes  Timed Charges Total Minutes: 55   Total Minutes: 55  Therapy Charges for Today       Code Description Service Date Service Provider Modifiers Qty    41294266611 HC PT THER PROC EA 15 MIN 2/2/2024 Pierce Alvarado, PT GP 2                      Pierce Alvarado, PT  2/2/2024

## 2024-02-09 ENCOUNTER — HOSPITAL ENCOUNTER (OUTPATIENT)
Dept: PHYSICAL THERAPY | Facility: HOSPITAL | Age: 64
Setting detail: THERAPIES SERIES
Discharge: HOME | End: 2024-02-09
Payer: COMMERCIAL

## 2024-02-09 DIAGNOSIS — Z96.652 STATUS POST TOTAL KNEE REPLACEMENT, LEFT: Primary | ICD-10-CM

## 2024-02-09 PROCEDURE — 97110 THERAPEUTIC EXERCISES: CPT

## 2024-02-09 NOTE — THERAPY TREATMENT NOTE
"  Outpatient Physical Therapy Ortho Treatment Note   [place]     Patient Name: Surjit Stevenson  : 1960  MRN: [id number]  Today's Date: 2024      Visit Date: 2024    Visit Dx:    ICD-10-CM ICD-9-CM   1. Status post total knee replacement, left  Z96.652 V43.65       Patient Active Problem List   Diagnosis    Primary osteoarthritis of left knee    Moderate episode of recurrent major depressive disorder    OA (osteoarthritis) of knee    Effusion of left knee    Iron deficiency anemia secondary to inadequate dietary iron intake    Post-traumatic osteoarthritis of left knee        Past Medical History:   Diagnosis Date    Arthritis         Past Surgical History:   Procedure Laterality Date    HERNIA REPAIR      KNEE SURGERY Bilateral     1980s    TOTAL KNEE ARTHROPLASTY Left 2024    Procedure: TOTAL KNEE ARTHROPLASTY WITH CORI ROBOT;  Surgeon: Iain Dowling MD;  Location: [facility];  Service: Robotics - Ortho;  Laterality: Left;                        PT Assessment/Plan       Row Name 24 0800          PT Assessment    Assessment Comments Patient continues to do well with PT. Plan to decrease patient's frequency to 1x per week.  -AS        PT Plan    PT Plan Comments Continue with current treatment plan.  -AS               User Key  (r) = Recorded By, (t) = Taken By, (c) = Cosigned By      Initials Name Provider Type    AS Pierce Alvarado, PT Physical Therapist                       OP Exercises       Row Name 24 0800          Subjective    Subjective Comments -- Patient states his knee is doing well and \"feels strong but I feel a click in it when I walk.\"  -AS        Total Minutes    71183 - PT Therapeutic Exercise Minutes 65  -AS --        Exercise 1    Exercise Name 1 -- Heel Slides  -AS     Time 1 -- 5 min  -AS        Exercise 2    Exercise Name 2 -- Wall Slides  -AS     Time 2 -- 5 min  -AS        Exercise 3    Exercise Name 3 -- Bike - Seat 1  -AS     Time 3 " "-- 5 min  -AS        Exercise 4    Exercise Name 4 -- Manual Flexion  -AS     Reps 4 -- 10  -AS     Time 4 -- 10 sec hold each  -AS        Exercise 5    Exercise Name 5 -- HS Stretch  -AS     Reps 5 -- 10  -AS     Time 5 -- 10 sec hold each  -AS        Exercise 6    Exercise Name 6 -- Heel Prop  -AS     Time 6 -- 5 min - 4#  -AS        Exercise 7    Exercise Name 7 -- Manual Extension  -AS     Reps 7 -- 10  -AS     Time 7 -- 10 sec hold each  -AS        Exercise 8    Exercise Name 8 -- QS  -AS     Reps 8 -- 25  -AS     Time 8 -- 5 sec hold each  -AS        Exercise 9    Exercise Name 9 -- 3-Way Hip  -AS     Reps 9 -- 25 each  -AS     Time 9 -- 1#  -AS        Exercise 10    Exercise Name 10 -- LAQ  -AS     Reps 10 -- 25  -AS     Time 10 -- 1#  -AS        Exercise 11    Exercise Name 11 -- TKE  -AS     Reps 11 -- 25  -AS     Time 11 -- Gold  -AS        Exercise 12    Exercise Name 12 -- Heel Raises  -AS     Reps 12 -- 25  -AS        Exercise 13    Exercise Name 13 -- Mini Squats  -AS     Reps 13 -- 25  -AS        Exercise 14    Exercise Name 14 -- 6\" FWD Step Ups  -AS     Reps 14 -- 25 each leg  -AS        Exercise 15    Exercise Name 15 -- 6\" Lateral Step Overs  -AS     Reps 15 -- 25  -AS               User Key  (r) = Recorded By, (t) = Taken By, (c) = Cosigned By      Initials Name Provider Type    AS Pierce Alvarado, PT Physical Therapist                                                    Time Calculation:   Start Time: 0817  Stop Time: 0925  Time Calculation (min): 68 min  Timed Charges  36799 - PT Therapeutic Exercise Minutes: 65  Total Minutes  Timed Charges Total Minutes: 65   Total Minutes: 65  Therapy Charges for Today       Code Description Service Date Service Provider Modifiers Qty    [id number]  PT THER PROC EA 15 MIN 2/9/2024 Pierce Alvarado, PT GP 2                      Pierce Alvarado, PT  2/9/2024     "

## 2024-02-12 ENCOUNTER — TELEPHONE (OUTPATIENT)
Dept: INTERNAL MEDICINE | Facility: CLINIC | Age: 64
End: 2024-02-12

## 2024-02-12 DIAGNOSIS — M17.12 PRIMARY OSTEOARTHRITIS OF LEFT KNEE: ICD-10-CM

## 2024-02-12 NOTE — TELEPHONE ENCOUNTER
Caller: Surjit Stevenson    Relationship: Self    Best call back number: [phone]    What is the medical concern/diagnosis: ORTHOPEDICS     What specialty or service is being requested: ORTHOPEDICS, ELBOWS    What is the provider, practice or medical service name:     What is the office location:   IN [place]    What is the office phone number:     Any additional details: DR ASH ONLY DOES HIPS AND KNEES

## 2024-02-13 RX ORDER — CELECOXIB 100 MG/1
CAPSULE ORAL
Qty: 180 CAPSULE | Refills: 0 | Status: SHIPPED | OUTPATIENT
Start: 2024-02-13

## 2024-02-16 ENCOUNTER — OFFICE VISIT (OUTPATIENT)
Dept: INTERNAL MEDICINE | Facility: CLINIC | Age: 64
End: 2024-02-16
Payer: COMMERCIAL

## 2024-02-16 ENCOUNTER — HOSPITAL ENCOUNTER (OUTPATIENT)
Dept: PHYSICAL THERAPY | Facility: HOSPITAL | Age: 64
Setting detail: THERAPIES SERIES
Discharge: HOME | End: 2024-02-16
Payer: COMMERCIAL

## 2024-02-16 VITALS
HEIGHT: 69 IN | BODY MASS INDEX: 29.38 KG/M2 | WEIGHT: 198.4 LBS | TEMPERATURE: 98.4 F | OXYGEN SATURATION: 97 % | DIASTOLIC BLOOD PRESSURE: 72 MMHG | SYSTOLIC BLOOD PRESSURE: 130 MMHG | HEART RATE: 88 BPM

## 2024-02-16 DIAGNOSIS — Z96.652 STATUS POST TOTAL KNEE REPLACEMENT, LEFT: Primary | ICD-10-CM

## 2024-02-16 DIAGNOSIS — M19.021 PRIMARY OSTEOARTHRITIS OF BOTH ELBOWS: Primary | ICD-10-CM

## 2024-02-16 DIAGNOSIS — M19.022 PRIMARY OSTEOARTHRITIS OF BOTH ELBOWS: Primary | ICD-10-CM

## 2024-02-16 PROCEDURE — 97110 THERAPEUTIC EXERCISES: CPT

## 2024-02-16 NOTE — PROGRESS NOTES
"      Surjit Stevenson is a 63 y.o. male who presents with a chief complaint of   Chief Complaint   Patient presents with    Elbow Pain     Wanting to see an ortho doctor that does elbows. Wanting to see if he would be able to get injections. Has been having bilateral elbow pain        HPI     Could try the celebrex and see if helps elbows.  Reach out to them and see who does elbows and send him there first. Then could try systemic treatment if needed.       The following portions of the patient's history were reviewed and updated as appropriate: allergies, current medications, past family history, past medical history, past social history, past surgical history and problem list.      Current Outpatient Medications:     celecoxib (CeleBREX) 100 MG capsule, TAKE 1 CAPSULE BY MOUTH TWICE A DAY AS NEEDED FOR MODERATE PAIN, Disp: 180 capsule, Rfl: 0            Physical Exam  /72 (BP Location: Left arm, Patient Position: Sitting, Cuff Size: Adult)   Pulse 88   Temp 98.4 °F (36.9 °C) (Infrared)   Ht 175.3 cm (69\")   Wt 90 kg (198 lb 6.4 oz)   SpO2 97%   BMI 29.30 kg/m²     Physical Exam  Musculoskeletal:      Comments: Signs of bony overgrowth of b/l elbows           Results for orders placed or performed during the hospital encounter of 01/08/24   Anaerobic Culture - Synovium, Knee, Left    Specimen: Knee, Left; Synovium   Result Value Ref Range    Anaerobic Culture No anaerobes isolated at 5 days    Tissue / Bone Culture - Tissue, Knee, Left    Specimen: Knee, Left; Tissue   Result Value Ref Range    Tissue Culture No growth at 3 days     Gram Stain Rare (1+) WBCs seen     Gram Stain No organisms seen    Basic Metabolic Panel    Specimen: Blood   Result Value Ref Range    Glucose 156 (H) 65 - 99 mg/dL    BUN 18 8 - 23 mg/dL    Creatinine 1.08 0.76 - 1.27 mg/dL    Sodium 135 (L) 136 - 145 mmol/L    Potassium 4.3 3.5 - 5.2 mmol/L    Chloride 103 98 - 107 mmol/L    CO2 26.4 22.0 - 29.0 mmol/L    Calcium 8.4 (L) 8.6 " - 10.5 mg/dL    BUN/Creatinine Ratio 16.7 7.0 - 25.0    Anion Gap 5.6 5.0 - 15.0 mmol/L    eGFR 78.1 >60.0 mL/min/1.73   CBC Auto Differential    Specimen: Blood   Result Value Ref Range    WBC 9.91 3.40 - 10.80 10*3/mm3    RBC 4.16 4.14 - 5.80 10*6/mm3    Hemoglobin 10.2 (L) 13.0 - 17.7 g/dL    Hematocrit 34.4 (L) 37.5 - 51.0 %    MCV 82.7 79.0 - 97.0 fL    MCH 24.5 (L) 26.6 - 33.0 pg    MCHC 29.7 (L) 31.5 - 35.7 g/dL    RDW 14.9 12.3 - 15.4 %    RDW-SD 44.8 37.0 - 54.0 fl    MPV 11.4 6.0 - 12.0 fL    Platelets 230 140 - 450 10*3/mm3    Neutrophil % 86.0 (H) 42.7 - 76.0 %    Lymphocyte % 4.6 (L) 19.6 - 45.3 %    Monocyte % 8.9 5.0 - 12.0 %    Eosinophil % 0.0 (L) 0.3 - 6.2 %    Basophil % 0.2 0.0 - 1.5 %    Immature Grans % 0.3 0.0 - 0.5 %    Neutrophils, Absolute 8.52 (H) 1.70 - 7.00 10*3/mm3    Lymphocytes, Absolute 0.46 (L) 0.70 - 3.10 10*3/mm3    Monocytes, Absolute 0.88 0.10 - 0.90 10*3/mm3    Eosinophils, Absolute 0.00 0.00 - 0.40 10*3/mm3    Basophils, Absolute 0.02 0.00 - 0.20 10*3/mm3    Immature Grans, Absolute 0.03 0.00 - 0.05 10*3/mm3    nRBC 0.0 0.0 - 0.2 /100 WBC   Tissue Pathology Exam    Specimen: A: Knee, Left; Synovium    B: Knee, Left; Synovium   Result Value Ref Range    Case Report       Surgical Pathology Report                         Case: [id number]                                  Authorizing Provider:  Iain Dowling MD      Collected:           01/08/2024 10:47 AM          Ordering Location:     [facility]   Received:            01/08/2024 12:38 PM                                 OR                                                                           Pathologist:           Марина Cole MD                                                    Specimens:   1) - Knee, Left, synovium #1                                                                        2) - Knee, Left, synovium #2                                                               Final Diagnosis   "     1.  Left knee, synovium #1, excision:   -Fibrin and acute inflammation only.    -No viable intact synovium present.    2.  Left knee, synovium #2, excision:  -Reactive fibrous synovial tissue with chronic active inflammation (up to 25 neutrophils in a high-power field) (see Comment).    SWM      Comment       The majority of the inflammation within the synovium is chronic inflammation with associated hemosiderin, consistent with degenerative arthritic disease. There are foci of increased neutrophils which are predominantly surface and in vessels (not diffuse throughout the synovium). Recommend close clinical follow up. This case is reviewed internally with Dr. Peres, who concurs.      Gross Description       1. Knee, Left.  Received fresh labeled \"left knee synovium #1\" is a 5.5 x 3.5 x 3.0 cm aggregate of mucinous yellow-white friable tissue, which is serially sectioned and representative sections are submitted as 1A-1C.    kelly/o/nicolasa    2. Knee, Left.  Received fresh labeled \"left knee synovium #2\" is a 5.5 x 5.5 x 3.5 cm aggregate of fibrofatty tissue and tan rubbery tissue.  There are no obvious areas of hemorrhage or necrosis.  Representative sections are submitted as 2A-2C.    jap/uso/swm               Diagnoses and all orders for this visit:    1. Primary osteoarthritis of both elbows (Primary)  -     Ambulatory Referral to Orthopedic Surgery      OA with worsening:  See if ortho at [facility] he is interested in seeing has any options, if not, can try managing pain with medication.  "

## 2024-02-23 ENCOUNTER — HOSPITAL ENCOUNTER (OUTPATIENT)
Dept: PHYSICAL THERAPY | Facility: HOSPITAL | Age: 64
Setting detail: THERAPIES SERIES
Discharge: HOME | End: 2024-02-23
Payer: COMMERCIAL

## 2024-02-23 DIAGNOSIS — Z96.652 STATUS POST TOTAL KNEE REPLACEMENT, LEFT: Primary | ICD-10-CM

## 2024-02-23 PROCEDURE — 97110 THERAPEUTIC EXERCISES: CPT

## 2024-02-23 NOTE — THERAPY TREATMENT NOTE
Outpatient Physical Therapy Ortho Treatment Note   [facility]     Patient Name: Surjit Stevenson  : 1960  MRN: [id number]  Today's Date: 2024      Visit Date: 2024    Visit Dx:    ICD-10-CM ICD-9-CM   1. Status post total knee replacement, left  Z96.652 V43.65       Patient Active Problem List   Diagnosis    Primary osteoarthritis of left knee    Moderate episode of recurrent major depressive disorder    OA (osteoarthritis) of knee    Effusion of left knee    Iron deficiency anemia secondary to inadequate dietary iron intake    Post-traumatic osteoarthritis of left knee        Past Medical History:   Diagnosis Date    Arthritis         Past Surgical History:   Procedure Laterality Date    HERNIA REPAIR      KNEE SURGERY Bilateral     1980s    TOTAL KNEE ARTHROPLASTY Left 2024    Procedure: TOTAL KNEE ARTHROPLASTY WITH CORI ROBOT;  Surgeon: Iain Dowling MD;  Location: [facility];  Service: Robotics - Ortho;  Laterality: Left;        PT Ortho       Row Name 24 0800       Left Lower Ext    Lt Knee Extension/Flexion AROM 0-130 degrees  -AS              User Key  (r) = Recorded By, (t) = Taken By, (c) = Cosigned By      Initials Name Provider Type    AS Pierce Alvarado, PT Physical Therapist                                 PT Assessment/Plan       Row Name 24 0800          PT Assessment    Assessment Comments Patient continues to do extremely well with PT. Patient's left knee ROM is 0-130 degrees and 4+/5 strength. Plan to wean patient onto a HEP only over next few weeks.  -AS        PT Plan    PT Plan Comments Continue with current treatment plan.  -AS               User Key  (r) = Recorded By, (t) = Taken By, (c) = Cosigned By      Initials Name Provider Type    AS Pierce Alvarado, PT Physical Therapist                       OP Exercises       Row Name 24 0923 24 0800          Subjective    Subjective Comments -- Patient states he is doing well this morning.   "-AS        Total Minutes    70227 - PT Therapeutic Exercise Minutes 60  -AS --        Exercise 1    Exercise Name 1 -- Heel Slides  -AS     Time 1 -- 5 min  -AS        Exercise 2    Exercise Name 2 -- Wall Slides  -AS     Time 2 -- D/C  -AS        Exercise 3    Exercise Name 3 -- Bike - Seat 1  -AS     Time 3 -- 5 min  -AS        Exercise 4    Exercise Name 4 -- Manual Flexion  -AS     Reps 4 -- --  -AS     Time 4 -- D/C  -AS        Exercise 5    Exercise Name 5 -- HS Stretch  -AS     Reps 5 -- 10  -AS     Time 5 -- 10 sec hold each  -AS        Exercise 6    Exercise Name 6 -- Heel Prop  -AS     Time 6 -- 5 min - 5#  -AS        Exercise 7    Exercise Name 7 -- Manual Extension  -AS     Reps 7 -- 10  -AS     Time 7 -- 10 sec hold each  -AS        Exercise 8    Exercise Name 8 -- QS  -AS     Reps 8 -- 25  -AS     Time 8 -- 5 sec hold each  -AS        Exercise 9    Exercise Name 9 -- 3-Way Hip  -AS     Reps 9 -- 25 each  -AS     Time 9 -- 1#  -AS        Exercise 10    Exercise Name 10 -- LAQ  -AS     Reps 10 -- 25  -AS     Time 10 -- 1#  -AS        Exercise 11    Exercise Name 11 -- TKE  -AS     Reps 11 -- 25  -AS     Time 11 -- Gold  -AS        Exercise 12    Exercise Name 12 -- Heel Raises  -AS     Reps 12 -- 25  -AS        Exercise 13    Exercise Name 13 -- Mini Squats  -AS     Reps 13 -- 25  -AS        Exercise 14    Exercise Name 14 -- 6\" FWD Step Ups  -AS     Reps 14 -- 25 each leg  -AS        Exercise 15    Exercise Name 15 -- 6\" Lateral Step Overs  -AS     Reps 15 -- 25  -AS               User Key  (r) = Recorded By, (t) = Taken By, (c) = Cosigned By      Initials Name Provider Type    AS Pierce Alvarado, PT Physical Therapist                                                    Time Calculation:   Start Time: 0820  Stop Time: 0923  Time Calculation (min): 63 min  Timed Charges  57237 - PT Therapeutic Exercise Minutes: 60  Total Minutes  Timed Charges Total Minutes: 60   Total Minutes: 60  Therapy Charges " for Today       Code Description Service Date Service Provider Modifiers Qty    27820270227 HC PT THER PROC EA 15 MIN 2/23/2024 Pierce Alvarado, PT GP 2                      Pierce Alvarado, PT  2/23/2024

## 2024-02-27 ENCOUNTER — OFFICE VISIT (OUTPATIENT)
Dept: ORTHOPEDIC SURGERY | Facility: CLINIC | Age: 64
End: 2024-02-27
Payer: COMMERCIAL

## 2024-02-27 VITALS — BODY MASS INDEX: 29.33 KG/M2 | WEIGHT: 198 LBS | HEIGHT: 69 IN

## 2024-02-27 DIAGNOSIS — Z96.652 STATUS POST LEFT KNEE REPLACEMENT: Primary | ICD-10-CM

## 2024-02-27 PROCEDURE — 99024 POSTOP FOLLOW-UP VISIT: CPT | Performed by: INTERNAL MEDICINE

## 2024-02-27 RX ORDER — MELOXICAM 7.5 MG/1
7.5 TABLET ORAL DAILY
COMMUNITY
Start: 2024-02-27 | End: 2024-04-27

## 2024-02-27 NOTE — PROGRESS NOTES
"Subjective:     Patient ID: Surjit Stevenson is a 63 y.o. male.    Chief Complaint:    History of Present Illness  Surjit Stevenson returns to clinic today for evaluation of left knee status post total knee arthroplasty 7 weeks ago.  The patient is doing incredibly well and states that he thinks I did a great job.  His pain is significantly improved but he still working on building up strength.  He denies any limitations in range of motion or any feelings of instability in his knee is significantly less swollen than preoperatively.     Social History     Occupational History    Not on file   Tobacco Use    Smoking status: Former     Packs/day: 1.00     Years: 10.00     Additional pack years: 0.00     Total pack years: 10.00     Types: Cigarettes     Quit date:      Years since quittin.1     Passive exposure: Past    Smokeless tobacco: Never    Tobacco comments:     quit 30 yrs ago; was 1 ppd for 11 yrs   Vaping Use    Vaping Use: Never used   Substance and Sexual Activity    Alcohol use: Yes     Comment: not currently, previously abused alcohol    Drug use: No    Sexual activity: Defer      Past Medical History:   Diagnosis Date    Arthritis      Past Surgical History:   Procedure Laterality Date    HERNIA REPAIR      KNEE SURGERY Bilateral         TOTAL KNEE ARTHROPLASTY Left 2024    Procedure: TOTAL KNEE ARTHROPLASTY WITH CORI ROBOT;  Surgeon: Iain Dowling MD;  Location: [facility];  Service: Robotics - Ortho;  Laterality: Left;       Family History   Problem Relation Age of Onset    Hypertension Mother     Prostate cancer Father     Kidney cancer Father     Malig Hyperthermia Neg Hx                  Objective:  Vitals:    24 1110   Weight: 89.8 kg (198 lb)   Height: 175.3 cm (69\")         24  1110   Weight: 89.8 kg (198 lb)     Body mass index is 29.24 kg/m².        Left Knee Exam     Tenderness   The patient is experiencing no tenderness.     Range of Motion   Extension:  0   Flexion:  " 130     Tests   Varus: negative Valgus: negative  Lachman:  Anterior - trace    Posterior - negative  Drawer:  Anterior - trace     Posterior - negative    Other   Erythema: absent  Scars: present  Sensation: normal  Pulse: present  Swelling: mild  Effusion: effusion present               Imaging: no new    Assessment:        1. Status post left knee replacement           Plan:          Discussed treatment options at length with patient at today's visit. I discussed with the patient that they are doing well from my standpoint.  The patient has achieved 0 to 130 degrees of flexion.  I discussed with them that it is important to continue working on strengthening and range of motion exercises.  They should continue to attend physical therapy until they are discharged by the therapist or until they feel like they are no longer making improvements.  I discussed with them that it is normal to have stiffness achiness and pain particularly at night and in the morning.  This will continue to improve as time goes on and may continue to improve for 6 to 12 months postoperatively. I offered the patient a narcotic refill.  The patient's surgical incision is healed without signs of infection and there are no signs of ligamentous instability.  It is now okay for the patient to soak or submerge the surgical incision underwater.  They should clean the incision daily with soapy water in the shower.  I would like the patient to notify our office immediately if they note any increasing redness, pain, fevers, chills, or drainage of any kind from their surgical incision as this would be abnormal at this point in time.  I offered the patient a narcotic refill.  They may discontinue the anticoagulant from my standpoint unless prescribed by another provider prior to surgery.  I would like to see the patient back in 6 weeks for 12-week follow-up appointment.  The patient voiced understanding and agreement with the plan.   Follow up: 6 weeks  with 2 views of the left knee      Surjit Stevenson was in agreement with plan and had all questions answered.     Medications:  No orders of the defined types were placed in this encounter.      Followup:  No follow-ups on file.    Diagnoses and all orders for this visit:    1. Status post left knee replacement (Primary)          Dictated utilizing Dragon dictation

## 2024-03-01 ENCOUNTER — APPOINTMENT (OUTPATIENT)
Dept: PHYSICAL THERAPY | Facility: HOSPITAL | Age: 64
End: 2024-03-01
Payer: COMMERCIAL

## 2024-03-08 ENCOUNTER — HOSPITAL ENCOUNTER (OUTPATIENT)
Dept: PHYSICAL THERAPY | Facility: HOSPITAL | Age: 64
Setting detail: THERAPIES SERIES
Discharge: HOME | End: 2024-03-08
Payer: COMMERCIAL

## 2024-03-08 DIAGNOSIS — Z96.652 STATUS POST TOTAL KNEE REPLACEMENT, LEFT: Primary | ICD-10-CM

## 2024-03-08 PROCEDURE — 97110 THERAPEUTIC EXERCISES: CPT

## 2024-03-08 NOTE — THERAPY TREATMENT NOTE
Outpatient Physical Therapy Ortho Treatment Note   Ida Jo     Patient Name: Surjit Stevenson  : 1960  MRN: [id number]  Today's Date: 3/8/2024      Visit Date: 2024    Visit Dx:    ICD-10-CM ICD-9-CM   1. Status post total knee replacement, left  Z96.652 V43.65       Patient Active Problem List   Diagnosis    Primary osteoarthritis of left knee    Moderate episode of recurrent major depressive disorder    OA (osteoarthritis) of knee    Effusion of left knee    Iron deficiency anemia secondary to inadequate dietary iron intake    Post-traumatic osteoarthritis of left knee        Past Medical History:   Diagnosis Date    Arthritis         Past Surgical History:   Procedure Laterality Date    HERNIA REPAIR      KNEE SURGERY Bilateral     1980s    TOTAL KNEE ARTHROPLASTY Left 2024    Procedure: TOTAL KNEE ARTHROPLASTY WITH CORI ROBOT;  Surgeon: Iain Dowling MD;  Location: [facility];  Service: Robotics - Ortho;  Laterality: Left;                        PT Assessment/Plan       Row Name 24 0800          PT Assessment    Assessment Comments Patient continues to do very well with PT at this time.  -AS        PT Plan    PT Plan Comments Continue with current treatment plan.  -AS               User Key  (r) = Recorded By, (t) = Taken By, (c) = Cosigned By      Initials Name Provider Type    AS Pierce Alvarado, PT Physical Therapist                       OP Exercises       Row Name 24 0914 24 0800          Subjective    Subjective Comments -- Patient states his knee is doing well. He states he is scheduled to see his Ortho in early April and plans to return to work at that time.  -AS        Total Minutes    08760 - PT Therapeutic Exercise Minutes 50  -AS --        Exercise 1    Exercise Name 1 -- Heel Slides  -AS     Time 1 -- 3 min  -AS        Exercise 2    Exercise Name 2 -- Wall Slides  -AS     Time 2 -- D/C  -AS        Exercise 3    Exercise Name 3 -- Bike - Seat 1  -AS      "Time 3 -- 3 min  -AS        Exercise 4    Exercise Name 4 -- Manual Flexion  -AS     Time 4 -- D/C  -AS        Exercise 5    Exercise Name 5 -- HS Stretch  -AS     Reps 5 -- 10  -AS     Time 5 -- 10 sec hold each  -AS        Exercise 6    Exercise Name 6 -- Heel Prop  -AS     Time 6 -- 5 min - 5#  -AS        Exercise 7    Exercise Name 7 -- Manual Extension  -AS     Reps 7 -- 10  -AS     Time 7 -- 10 sec hold each  -AS        Exercise 8    Exercise Name 8 -- QS  -AS     Reps 8 -- 25  -AS     Time 8 -- 5 sec hold each  -AS        Exercise 9    Exercise Name 9 -- 3-Way Hip  -AS     Reps 9 -- 25 each  -AS     Time 9 -- 2#  -AS        Exercise 10    Exercise Name 10 -- LAQ  -AS     Reps 10 -- 25  -AS     Time 10 -- 2#  -AS        Exercise 11    Exercise Name 11 -- TKE  -AS     Reps 11 -- 25  -AS     Time 11 -- Gold  -AS        Exercise 12    Exercise Name 12 -- Heel Raises  -AS     Reps 12 -- 25  -AS        Exercise 13    Exercise Name 13 -- Mini Squats  -AS     Reps 13 -- 25  -AS        Exercise 14    Exercise Name 14 -- 6\" FWD Step Ups  -AS     Reps 14 -- 25 each leg  -AS        Exercise 15    Exercise Name 15 -- 6\" Lateral Step Overs  -AS     Reps 15 -- 25  -AS               User Key  (r) = Recorded By, (t) = Taken By, (c) = Cosigned By      Initials Name Provider Type    AS Pierce Alvarado, PT Physical Therapist                                                    Time Calculation:   Start Time: 0822  Stop Time: 0914  Time Calculation (min): 52 min  Timed Charges  31120 - PT Therapeutic Exercise Minutes: 50  Total Minutes  Timed Charges Total Minutes: 50   Total Minutes: 50  Therapy Charges for Today       Code Description Service Date Service Provider Modifiers Qty    [id number]  PT THER PROC EA 15 MIN 3/8/2024 Pierce Alvarado, PT GP 2                      Pierce Alvarado, PT  3/8/2024     "

## 2024-04-09 ENCOUNTER — OFFICE VISIT (OUTPATIENT)
Dept: ORTHOPEDIC SURGERY | Facility: CLINIC | Age: 64
End: 2024-04-09
Payer: COMMERCIAL

## 2024-04-09 VITALS — WEIGHT: 198 LBS | BODY MASS INDEX: 29.33 KG/M2 | HEIGHT: 69 IN

## 2024-04-09 DIAGNOSIS — Z96.652 STATUS POST LEFT KNEE REPLACEMENT: Primary | ICD-10-CM

## 2024-04-09 PROCEDURE — 73560 X-RAY EXAM OF KNEE 1 OR 2: CPT | Performed by: INTERNAL MEDICINE

## 2024-04-09 PROCEDURE — 99213 OFFICE O/P EST LOW 20 MIN: CPT | Performed by: INTERNAL MEDICINE

## 2024-04-09 NOTE — PROGRESS NOTES
"Subjective:     Patient ID: Surjit Stevenson is a 63 y.o. male.    Chief Complaint:    History of Present Illness  Surjit Stevenson returns to clinic today for evaluation of left knee status post total knee arthroplasty 3 months ago.  The patient is doing quite well and is very happy with the result.  He states that his knee pain has completely resolved and he no longer has recurrent large effusions like he was having preoperatively.  He is eager to return to work and hopeful return to work tomorrow.  He states that occasionally he feels a click in his knee but it is not painful when it occurs.     Social History     Occupational History    Not on file   Tobacco Use    Smoking status: Former     Current packs/day: 0.00     Average packs/day: 1 pack/day for 10.0 years (10.0 ttl pk-yrs)     Types: Cigarettes     Start date:      Quit date:      Years since quittin.2     Passive exposure: Past    Smokeless tobacco: Never    Tobacco comments:     quit 30 yrs ago; was 1 ppd for 11 yrs   Vaping Use    Vaping status: Never Used   Substance and Sexual Activity    Alcohol use: Yes     Comment: not currently, previously abused alcohol    Drug use: No    Sexual activity: Defer      Past Medical History:   Diagnosis Date    Arthritis      Past Surgical History:   Procedure Laterality Date    HERNIA REPAIR      KNEE SURGERY Bilateral         TOTAL KNEE ARTHROPLASTY Left 2024    Procedure: TOTAL KNEE ARTHROPLASTY WITH CORI ROBOT;  Surgeon: Iain Dowling MD;  Location: [facility];  Service: Robotics - Ortho;  Laterality: Left;       Family History   Problem Relation Age of Onset    Hypertension Mother     Prostate cancer Father     Kidney cancer Father     Malig Hyperthermia Neg Hx                  Objective:  Vitals:    24 1017   Weight: 89.8 kg (198 lb)   Height: 175.3 cm (69\")         24  1017   Weight: 89.8 kg (198 lb)     Body mass index is 29.24 kg/m².      Left Knee Exam     Tenderness   The " patient is experiencing no tenderness.     Range of Motion   Extension:  0   Flexion:  120     Tests   Varus: negative Valgus: negative  Lachman:  Anterior - negative    Posterior - negative  Drawer:  Anterior - negative     Posterior - negative    Other   Erythema: absent  Scars: present  Sensation: normal  Pulse: present  Swelling: mild  Effusion: effusion present             Imagin views of the left knee were ordered and reviewed by myself in the office today  Indication: left knee replacement  Findings: X-rays demonstrate a cemented left total knee arthroplasty with an unresurfaced patella with implants in expected position no signs of loosening fracture, dislocation, subluxation, wear or subsidence.  No new acute osseous abnormality.  There are staples in place from prior ACL and MCL reconstruction  Comparative studies: 2-week postop radiographs      Assessment:        1. Status post left knee replacement           Plan:          Discussed treatment options at length with patient at today's visit. I discussed with the patient that they are doing well from my standpoint.  I am happy with the progress that they have made and they have achieved 0 to 120 degrees.  I would like them to continue to work on range of motion and strengthening exercises.  I discussed with them that it is normal to have stiffness achiness and pain particularly at night and in the morning.  This will continue to improve as time goes on and may continue to improve for 6 to 12 months postoperatively.   The patient's surgical incision is healed without signs of infection and there are no signs of ligamentous instability.  I would like the patient to notify our office immediately if they note any increasing redness, pain, fevers, chills, or drainage of any kind from their surgical incision as this would be abnormal at this point in time.  I discussed with the patient that at this point in time we do not need to see them back for 9 months  for a 1 year follow-up appointment.  I did discuss however that I am happy to see the patient sooner if issues questions or concerns arise.  The patient voiced understanding and agreement with the plan.   Follow up: January 2025 with 3 views of the left knee      Surjit Stevenson was in agreement with plan and had all questions answered.     Medications:  No orders of the defined types were placed in this encounter.      Followup:  No follow-ups on file.    Diagnoses and all orders for this visit:    1. Status post left knee replacement (Primary)  -     XR Knee 1 or 2 View Left          Dictated utilizing Dragon dictation

## 2024-05-04 NOTE — TELEPHONE ENCOUNTER
Provider: LUIS     Caller: ANA MARTINEZ     Relationship to Patient: SELF    Phone Number: [phone] (home)       Reason for Call: PATIENT IS ON MEDICAL LEAVE FROM WORK. SINCE SX ISNT BEING SCHEDULED FOR KNEE REPLACEMENT. PATIENT NEEDS A LETTER FOR HIS WORK STATING HE CAN GO BACK TO WORK WITH NO RESTRICTIONS.    PATIENT REQUESTING A CALL BACK  PLEASE ADVISE      
No

## 2024-05-14 ENCOUNTER — OFFICE VISIT (OUTPATIENT)
Dept: INTERNAL MEDICINE | Facility: CLINIC | Age: 64
End: 2024-05-14
Payer: COMMERCIAL

## 2024-05-14 ENCOUNTER — TELEPHONE (OUTPATIENT)
Dept: INTERNAL MEDICINE | Facility: CLINIC | Age: 64
End: 2024-05-14

## 2024-05-14 VITALS
DIASTOLIC BLOOD PRESSURE: 84 MMHG | HEART RATE: 89 BPM | WEIGHT: 200 LBS | TEMPERATURE: 98.2 F | HEIGHT: 69 IN | BODY MASS INDEX: 29.62 KG/M2 | OXYGEN SATURATION: 97 % | SYSTOLIC BLOOD PRESSURE: 138 MMHG

## 2024-05-14 DIAGNOSIS — Z00.00 ANNUAL PHYSICAL EXAM: Primary | ICD-10-CM

## 2024-05-14 DIAGNOSIS — M17.12 PRIMARY OSTEOARTHRITIS OF LEFT KNEE: ICD-10-CM

## 2024-05-14 DIAGNOSIS — G89.29 OTHER CHRONIC PAIN: ICD-10-CM

## 2024-05-14 DIAGNOSIS — R97.20 ELEVATED PSA: ICD-10-CM

## 2024-05-14 DIAGNOSIS — Z78.9 ALCOHOL USE: ICD-10-CM

## 2024-05-14 DIAGNOSIS — F33.1 MODERATE EPISODE OF RECURRENT MAJOR DEPRESSIVE DISORDER: ICD-10-CM

## 2024-05-14 PROCEDURE — 99396 PREV VISIT EST AGE 40-64: CPT | Performed by: INTERNAL MEDICINE

## 2024-05-14 RX ORDER — DULOXETIN HYDROCHLORIDE 30 MG/1
30 CAPSULE, DELAYED RELEASE ORAL DAILY
Qty: 90 CAPSULE | Refills: 1 | Status: SHIPPED | OUTPATIENT
Start: 2024-05-14

## 2024-05-14 RX ORDER — MELOXICAM 7.5 MG/1
7.5 TABLET ORAL DAILY
COMMUNITY
Start: 2024-05-06

## 2024-05-14 NOTE — PROGRESS NOTES
"Chief Complaint  Annual Exam    Subjective        Surjit Stevenson presents to [facility]  History of Present Illness    Wanted to discuss Cymbalta as Mom takes this and has responded well.     Objective   Vital Signs:  /84 (BP Location: Left arm, Patient Position: Sitting, Cuff Size: Adult)   Pulse 89   Temp 98.2 °F (36.8 °C) (Infrared)   Ht 175.3 cm (69\")   Wt 90.7 kg (200 lb)   SpO2 97%   BMI 29.53 kg/m²   Estimated body mass index is 29.53 kg/m² as calculated from the following:    Height as of this encounter: 175.3 cm (69\").    Weight as of this encounter: 90.7 kg (200 lb).               Physical Exam  Vitals reviewed.   Constitutional:       General: He is not in acute distress.     Appearance: Normal appearance.   HENT:      Head: Normocephalic and atraumatic.      Nose: Nose normal.      Mouth/Throat:      Mouth: Mucous membranes are moist.   Eyes:      Conjunctiva/sclera: Conjunctivae normal.   Cardiovascular:      Rate and Rhythm: Normal rate and regular rhythm.      Pulses: Normal pulses.      Heart sounds: Normal heart sounds.   Pulmonary:      Effort: Pulmonary effort is normal.      Breath sounds: Normal breath sounds.   Abdominal:      Palpations: Abdomen is soft.      Tenderness: There is no abdominal tenderness.   Musculoskeletal:      Right lower leg: No edema.      Left lower leg: No edema.   Skin:     General: Skin is warm and dry.   Neurological:      General: No focal deficit present.      Mental Status: He is alert.   Psychiatric:         Mood and Affect: Mood normal.        Result Review :    The following data was reviewed by: Felicia Tomlinson MD on 05/14/2024:  Common labs          12/26/2023    13:35 12/26/2023    13:36 12/28/2023    09:25 1/9/2024    03:52   Common Labs   Glucose  93  99  156    BUN  17  15  18    Creatinine  0.88  0.99  1.08    Sodium  134  138  135    Potassium  4.6  5.2  4.3    Chloride  100  102  103    Calcium  9.0  9.2  8.4  "   Total Protein   7.3     Albumin  3.6  4.0     Total Bilirubin  0.2  0.3     Alkaline Phosphatase  126  131     AST (SGOT)  17  18     ALT (SGPT)  14  20     WBC 6.09   6.65  9.91    Hemoglobin 12.0   12.6  10.2    Hematocrit 40.4   39.2  34.4    Platelets 250   243  230    Hemoglobin A1C 5.40         Data reviewed : reviewed ortho notes             Assessment and Plan     Diagnoses and all orders for this visit:    1. Annual physical exam (Primary)    2. Elevated PSA  -     Ambulatory Referral to Urology    3. Moderate episode of recurrent major depressive disorder  -     DULoxetine (CYMBALTA) 30 MG capsule; Take 1 capsule by mouth Daily.  Dispense: 90 capsule; Refill: 1    4. Other chronic pain  -     DULoxetine (CYMBALTA) 30 MG capsule; Take 1 capsule by mouth Daily.  Dispense: 90 capsule; Refill: 1    5. Primary osteoarthritis of left knee    6. Alcohol use      Discussed ways to improve ASCVD risk including reducing alcohol consumption and continuing to stay active    He brought labs today from work lab from 3/2024 with PSA of 8.1 compared to 3.6 last year.  STAT referral to urology.     Try cymbalta for chronic arthritis pain         Follow Up     Return in about 2 months (around 7/14/2024) for f/u cymbalta.  Patient was given instructions and counseling regarding his condition or for health maintenance advice. Please see specific information pulled into the AVS if appropriate.

## 2024-05-14 NOTE — TELEPHONE ENCOUNTER
OK FOR HUB TO READ.  LVM for patient letting him know that I have him schedule for this afternoon for 3:30pm

## 2024-06-24 RX ORDER — ONDANSETRON 4 MG/1
TABLET, FILM COATED ORAL
Qty: 30 TABLET | Refills: 0 | OUTPATIENT
Start: 2024-06-24

## 2024-12-23 DIAGNOSIS — F33.1 MODERATE EPISODE OF RECURRENT MAJOR DEPRESSIVE DISORDER: ICD-10-CM

## 2024-12-23 DIAGNOSIS — G89.29 OTHER CHRONIC PAIN: ICD-10-CM

## 2024-12-24 RX ORDER — DULOXETIN HYDROCHLORIDE 30 MG/1
30 CAPSULE, DELAYED RELEASE ORAL DAILY
Qty: 90 CAPSULE | Refills: 1 | Status: SHIPPED | OUTPATIENT
Start: 2024-12-24

## 2024-12-24 NOTE — TELEPHONE ENCOUNTER
Rx Refill Note  Requested Prescriptions     Pending Prescriptions Disp Refills    DULoxetine (CYMBALTA) 30 MG capsule [Pharmacy Med Name: DULoxetine HCL DR 30 MG CAPSULE] 90 capsule 1     Sig: TAKE 1 CAPSULE BY MOUTH DAILY      Last office visit with prescribing clinician: 5/14/2024   Last telemedicine visit with prescribing clinician: Visit date not found   Next office visit with prescribing clinician: Visit date not found                         Would you like a call back once the refill request has been completed: [] Yes [] No    If the office needs to give you a call back, can they leave a voicemail: [] Yes [] No    Gemma Man MA  12/24/24, 07:54 EST

## 2025-02-04 ENCOUNTER — OFFICE VISIT (OUTPATIENT)
Dept: ORTHOPEDIC SURGERY | Facility: CLINIC | Age: 65
End: 2025-02-04
Payer: COMMERCIAL

## 2025-02-04 VITALS — WEIGHT: 200 LBS | HEIGHT: 69 IN | BODY MASS INDEX: 29.62 KG/M2

## 2025-02-04 DIAGNOSIS — Z96.652 STATUS POST LEFT KNEE REPLACEMENT: Primary | ICD-10-CM

## 2025-02-04 NOTE — PROGRESS NOTES
Subjective:     Patient ID: Surjit Stevenson is a 64 y.o. male.    Chief Complaint:    History of Present Illness  Surjit Stevenson returns to clinic today for evaluation of left knee status post total knee arthroplasty performed by myself  13 months  ago.  He is no longer attending physical therapy and progressing well.  The patient denies any fevers chills or drainage from their surgical incision. He is happy with the result so far. He states that the pain and swelling are improving.  He is ambulatory today with no limp or assistive device        Social History     Occupational History    Not on file   Tobacco Use    Smoking status: Former     Current packs/day: 0.00     Average packs/day: 1 pack/day for 10.0 years (10.0 ttl pk-yrs)     Types: Cigarettes     Start date:      Quit date:      Years since quittin.1     Passive exposure: Past    Smokeless tobacco: Never    Tobacco comments:     quit 30 yrs ago; was 1 ppd for 11 yrs   Vaping Use    Vaping status: Never Used   Substance and Sexual Activity    Alcohol use: Yes     Comment: not currently, previously abused alcohol    Drug use: No    Sexual activity: Defer      Past Medical History:   Diagnosis Date    Arthritis      Past Surgical History:   Procedure Laterality Date    HERNIA REPAIR      KNEE SURGERY Bilateral         TOTAL KNEE ARTHROPLASTY Left 2024    Procedure: TOTAL KNEE ARTHROPLASTY WITH CORI ROBOT;  Surgeon: Iain Dowling MD;  Location: [facility];  Service: Robotics - Ortho;  Laterality: Left;       Family History   Problem Relation Age of Onset    Hypertension Mother     Prostate cancer Father     Kidney cancer Father     Malig Hyperthermia Neg Hx                  Objective:  There were no vitals filed for this visit.  There were no vitals filed for this visit.  There is no height or weight on file to calculate BMI.  BMI is >= 25 and <30. (Overweight) The following options were offered after discussion;: referral to primary  care        Left Knee Exam     Tenderness   The patient is experiencing no tenderness.     Range of Motion   Extension:  0   Flexion:  120     Tests   Varus: negative Valgus: negative  Lachman:  Anterior - negative    Posterior - negative  Drawer:  Anterior - negative     Posterior - negative    Other   Erythema: absent  Scars: present  Sensation: normal  Pulse: present  Swelling: mild  Effusion: no effusion present               Imagin views of the left knee were ordered and reviewed by myself in the office today  Indication: left knee replacement  Findings: X-rays demonstrate a cemented left total knee arthroplasty with an unresurfaced patella with implants in expected position no signs of loosening fracture, dislocation, subluxation, wear or subsidence.  No new acute osseous abnormality.  Comparative studies: last visit radiographs      Assessment:        1. Status post left knee replacement           Plan:          Discussed treatment options at length with patient at today's visit. I discussed with the patient that they are doing well from my standpoint.  I am happy with the progress that they have made.  They are ambulatory today with no assistive device and no limp or residual deficits.  I would like them to continue to work on range of motion and strengthening exercises.   The patient has no restrictions from my standpoint.  The patient's surgical incision is healed without signs of infection.  I would like the patient to notify our office immediately if they note any increasing redness, pain, fevers, chills, or drainage of any kind from their surgical incision as this would be abnormal at this point in time.  I discussed with the patient that at this point in time we do not need to see them back for another follow-up appointment.  I did discuss however that I am happy to see the patient at any point if issues questions or concerns arise.  The patient voiced understanding and agreement with the plan.    Follow up: as needed      Surjit Stevenson was in agreement with plan and had all questions answered.     Medications:  No orders of the defined types were placed in this encounter.      Followup:  No follow-ups on file.    Diagnoses and all orders for this visit:    1. Status post left knee replacement (Primary)          Dictated utilizing Dragon dictation

## 2025-07-15 DIAGNOSIS — F33.1 MODERATE EPISODE OF RECURRENT MAJOR DEPRESSIVE DISORDER: ICD-10-CM

## 2025-07-15 DIAGNOSIS — G89.29 OTHER CHRONIC PAIN: ICD-10-CM

## 2025-07-15 RX ORDER — DULOXETIN HYDROCHLORIDE 30 MG/1
30 CAPSULE, DELAYED RELEASE ORAL DAILY
Qty: 90 CAPSULE | Refills: 1 | Status: SHIPPED | OUTPATIENT
Start: 2025-07-15

## (undated) DEVICE — 3M™ IOBAN™ 2 ANTIMICROBIAL INCISE DRAPE 6651EZ: Brand: IOBAN™ 2

## (undated) DEVICE — CONTAINER,SPECIMEN,OR STERILE,4OZ: Brand: MEDLINE

## (undated) DEVICE — ADHS SKIN SURG TISS VISC PREMIERPRO EXOFIN HI/VISC FAST/DRY

## (undated) DEVICE — GLV SURG SENSICARE PI LF PF 7.5

## (undated) DEVICE — DUAL CUT SAGITTAL BLADE

## (undated) DEVICE — INTENDED FOR TISSUE SEPARATION, AND OTHER PROCEDURES THAT REQUIRE A SHARP SURGICAL BLADE TO PUNCTURE OR CUT.: Brand: BARD-PARKER ® STAINLESS STEEL BLADES

## (undated) DEVICE — MAT FLR ABSORBENT LG 4FT 10 2.5FT

## (undated) DEVICE — SOL IRR H2O BTL 1000ML STRL

## (undated) DEVICE — CULT AER/ANAEROB FASTIDIOUS BACT

## (undated) DEVICE — SUT MNCRYL 2/0 CT1 36IN

## (undated) DEVICE — NEEDLE, QUINCKE, 20GX3.5": Brand: MEDLINE

## (undated) DEVICE — PK KN TOTL 90

## (undated) DEVICE — PREP SOL POVIDONE/IODINE BT 4OZ

## (undated) DEVICE — YANKAUER,BULB TIP,W/O VENT,RIGID,STERILE: Brand: MEDLINE

## (undated) DEVICE — Device

## (undated) DEVICE — DISPOSABLE TOURNIQUET CUFF SINGLE BLADDER, SINGLE PORT AND QUICK CONNECT CONNECTOR: Brand: COLOR CUFF

## (undated) DEVICE — SOL ISO/ALC RUB 70PCT 4OZ

## (undated) DEVICE — WRAP KNEE COLD THERAPY

## (undated) DEVICE — TBG PENCL TELESCP MEGADYNE SMOKE EVAC 10FT

## (undated) DEVICE — FRAZIER SUCTION INSTRUMENT 10 FR W/CONTROL VENT & OBTURATOR: Brand: FRAZIER

## (undated) DEVICE — SYR LUERLOK 30CC

## (undated) DEVICE — GLV SURG SENSICARE PI LF PF 8 GRN STRL

## (undated) DEVICE — FOAM BUMP ROUND LARGE: Brand: MEDLINE INDUSTRIES, INC.

## (undated) DEVICE — DRP SURG U/DRP INVISISHIELD PA 48X52IN